# Patient Record
Sex: FEMALE | Race: WHITE | NOT HISPANIC OR LATINO | Employment: OTHER | URBAN - METROPOLITAN AREA
[De-identification: names, ages, dates, MRNs, and addresses within clinical notes are randomized per-mention and may not be internally consistent; named-entity substitution may affect disease eponyms.]

---

## 2020-09-21 ENCOUNTER — HOSPITAL ENCOUNTER (INPATIENT)
Facility: HOSPITAL | Age: 55
LOS: 1 days | Discharge: HOME | End: 2020-09-22
Attending: EMERGENCY MEDICINE | Admitting: INTERNAL MEDICINE

## 2020-09-21 ENCOUNTER — APPOINTMENT (OUTPATIENT)
Dept: CT IMAGING | Facility: HOSPITAL | Age: 55
End: 2020-09-21

## 2020-09-21 DIAGNOSIS — R41.0 CONFUSION: Primary | ICD-10-CM

## 2020-09-21 DIAGNOSIS — R47.1 DYSARTHRIA: ICD-10-CM

## 2020-09-21 LAB
ANION GAP SERPL CALCULATED.3IONS-SCNC: 9 MMOL/L (ref 5–15)
BASOPHILS # BLD AUTO: 0 10*3/MM3 (ref 0–0.2)
BASOPHILS NFR BLD AUTO: 0.6 % (ref 0–1.5)
BUN SERPL-MCNC: 13 MG/DL (ref 6–20)
BUN SERPL-MCNC: ABNORMAL MG/DL
BUN/CREAT SERPL: ABNORMAL
CALCIUM SPEC-SCNC: 9 MG/DL (ref 8.6–10.5)
CHLORIDE SERPL-SCNC: 108 MMOL/L (ref 98–107)
CO2 SERPL-SCNC: 27 MMOL/L (ref 22–29)
CREAT SERPL-MCNC: 1.24 MG/DL (ref 0.57–1)
DEPRECATED RDW RBC AUTO: 40.7 FL (ref 37–54)
EOSINOPHIL # BLD AUTO: 0.3 10*3/MM3 (ref 0–0.4)
EOSINOPHIL NFR BLD AUTO: 3.9 % (ref 0.3–6.2)
ERYTHROCYTE [DISTWIDTH] IN BLOOD BY AUTOMATED COUNT: 13.6 % (ref 12.3–15.4)
GFR SERPL CREATININE-BSD FRML MDRD: 45 ML/MIN/1.73
GLUCOSE BLDC GLUCOMTR-MCNC: 89 MG/DL (ref 70–105)
GLUCOSE SERPL-MCNC: 82 MG/DL (ref 65–99)
HCT VFR BLD AUTO: 39.5 % (ref 34–46.6)
HGB BLD-MCNC: 13.1 G/DL (ref 12–15.9)
LYMPHOCYTES # BLD AUTO: 2.8 10*3/MM3 (ref 0.7–3.1)
LYMPHOCYTES NFR BLD AUTO: 33.4 % (ref 19.6–45.3)
MCH RBC QN AUTO: 28.8 PG (ref 26.6–33)
MCHC RBC AUTO-ENTMCNC: 33.3 G/DL (ref 31.5–35.7)
MCV RBC AUTO: 86.5 FL (ref 79–97)
MONOCYTES # BLD AUTO: 0.9 10*3/MM3 (ref 0.1–0.9)
MONOCYTES NFR BLD AUTO: 10.4 % (ref 5–12)
NEUTROPHILS NFR BLD AUTO: 4.4 10*3/MM3 (ref 1.7–7)
NEUTROPHILS NFR BLD AUTO: 51.7 % (ref 42.7–76)
NRBC BLD AUTO-RTO: 0.1 /100 WBC (ref 0–0.2)
PLATELET # BLD AUTO: 341 10*3/MM3 (ref 140–450)
PMV BLD AUTO: 7.6 FL (ref 6–12)
POTASSIUM SERPL-SCNC: 4 MMOL/L (ref 3.5–5.2)
RBC # BLD AUTO: 4.57 10*6/MM3 (ref 3.77–5.28)
SODIUM SERPL-SCNC: 144 MMOL/L (ref 136–145)
WBC # BLD AUTO: 8.5 10*3/MM3 (ref 3.4–10.8)

## 2020-09-21 PROCEDURE — 82962 GLUCOSE BLOOD TEST: CPT

## 2020-09-21 PROCEDURE — 80048 BASIC METABOLIC PNL TOTAL CA: CPT | Performed by: EMERGENCY MEDICINE

## 2020-09-21 PROCEDURE — 93005 ELECTROCARDIOGRAM TRACING: CPT | Performed by: EMERGENCY MEDICINE

## 2020-09-21 PROCEDURE — 99284 EMERGENCY DEPT VISIT MOD MDM: CPT

## 2020-09-21 PROCEDURE — 70450 CT HEAD/BRAIN W/O DYE: CPT

## 2020-09-21 PROCEDURE — 85025 COMPLETE CBC W/AUTO DIFF WBC: CPT | Performed by: EMERGENCY MEDICINE

## 2020-09-21 RX ORDER — SODIUM CHLORIDE 0.9 % (FLUSH) 0.9 %
10 SYRINGE (ML) INJECTION AS NEEDED
Status: DISCONTINUED | OUTPATIENT
Start: 2020-09-21 | End: 2020-09-22 | Stop reason: HOSPADM

## 2020-09-22 ENCOUNTER — APPOINTMENT (OUTPATIENT)
Dept: MRI IMAGING | Facility: HOSPITAL | Age: 55
End: 2020-09-22

## 2020-09-22 ENCOUNTER — APPOINTMENT (OUTPATIENT)
Dept: CARDIOLOGY | Facility: HOSPITAL | Age: 55
End: 2020-09-22

## 2020-09-22 ENCOUNTER — APPOINTMENT (OUTPATIENT)
Dept: CT IMAGING | Facility: HOSPITAL | Age: 55
End: 2020-09-22

## 2020-09-22 VITALS
DIASTOLIC BLOOD PRESSURE: 81 MMHG | BODY MASS INDEX: 42.65 KG/M2 | HEART RATE: 72 BPM | SYSTOLIC BLOOD PRESSURE: 178 MMHG | OXYGEN SATURATION: 95 % | TEMPERATURE: 98.1 F | HEIGHT: 65 IN | WEIGHT: 256 LBS | RESPIRATION RATE: 22 BRPM

## 2020-09-22 PROBLEM — E66.01 MORBID OBESITY (HCC): Chronic | Status: ACTIVE | Noted: 2020-09-22

## 2020-09-22 PROBLEM — G43.909 MIGRAINE: Chronic | Status: ACTIVE | Noted: 2019-10-09

## 2020-09-22 PROBLEM — F32.A DEPRESSIVE DISORDER: Status: ACTIVE | Noted: 2019-10-09

## 2020-09-22 PROBLEM — R41.0 CONFUSION: Status: ACTIVE | Noted: 2020-09-22

## 2020-09-22 PROBLEM — K21.9 GERD WITHOUT ESOPHAGITIS: Chronic | Status: ACTIVE | Noted: 2020-09-22

## 2020-09-22 PROBLEM — G43.909 MIGRAINE: Status: ACTIVE | Noted: 2019-10-09

## 2020-09-22 PROBLEM — J30.2 SEASONAL ALLERGIES: Chronic | Status: ACTIVE | Noted: 2020-09-22

## 2020-09-22 PROBLEM — R47.9 DIFFICULTY WITH SPEECH: Status: ACTIVE | Noted: 2020-09-22

## 2020-09-22 PROBLEM — N17.9 AKI (ACUTE KIDNEY INJURY) (HCC): Status: ACTIVE | Noted: 2020-09-22

## 2020-09-22 PROBLEM — Z78.0 MENOPAUSE: Chronic | Status: ACTIVE | Noted: 2020-09-22

## 2020-09-22 LAB
CHOLEST SERPL-MCNC: 175 MG/DL (ref 0–200)
GLUCOSE BLDC GLUCOMTR-MCNC: 105 MG/DL (ref 70–105)
HBA1C MFR BLD: 5.8 % (ref 3.5–5.6)
HDLC SERPL-MCNC: 45 MG/DL (ref 40–60)
HOLD SPECIMEN: NORMAL
LDLC SERPL CALC-MCNC: 95 MG/DL (ref 0–100)
LDLC/HDLC SERPL: 2.12 {RATIO}
TRIGL SERPL-MCNC: 173 MG/DL (ref 0–150)
TSH SERPL DL<=0.05 MIU/L-ACNC: 1.84 UIU/ML (ref 0.27–4.2)
VIT B12 BLD-MCNC: 890 PG/ML (ref 211–946)
VLDLC SERPL-MCNC: 34.6 MG/DL

## 2020-09-22 PROCEDURE — 97165 OT EVAL LOW COMPLEX 30 MIN: CPT

## 2020-09-22 PROCEDURE — 70551 MRI BRAIN STEM W/O DYE: CPT

## 2020-09-22 PROCEDURE — 80061 LIPID PANEL: CPT | Performed by: NURSE PRACTITIONER

## 2020-09-22 PROCEDURE — 83036 HEMOGLOBIN GLYCOSYLATED A1C: CPT | Performed by: NURSE PRACTITIONER

## 2020-09-22 PROCEDURE — 70496 CT ANGIOGRAPHY HEAD: CPT

## 2020-09-22 PROCEDURE — 84443 ASSAY THYROID STIM HORMONE: CPT | Performed by: NURSE PRACTITIONER

## 2020-09-22 PROCEDURE — 97161 PT EVAL LOW COMPLEX 20 MIN: CPT

## 2020-09-22 PROCEDURE — 25010000002 ENOXAPARIN PER 10 MG: Performed by: NURSE PRACTITIONER

## 2020-09-22 PROCEDURE — 82962 GLUCOSE BLOOD TEST: CPT

## 2020-09-22 PROCEDURE — 93306 TTE W/DOPPLER COMPLETE: CPT | Performed by: INTERNAL MEDICINE

## 2020-09-22 PROCEDURE — 99236 HOSP IP/OBS SAME DATE HI 85: CPT | Performed by: INTERNAL MEDICINE

## 2020-09-22 PROCEDURE — 0 IOPAMIDOL PER 1 ML: Performed by: INTERNAL MEDICINE

## 2020-09-22 PROCEDURE — 99253 IP/OBS CNSLTJ NEW/EST LOW 45: CPT | Performed by: NURSE PRACTITIONER

## 2020-09-22 PROCEDURE — 82607 VITAMIN B-12: CPT | Performed by: NURSE PRACTITIONER

## 2020-09-22 PROCEDURE — 70498 CT ANGIOGRAPHY NECK: CPT

## 2020-09-22 PROCEDURE — 93306 TTE W/DOPPLER COMPLETE: CPT

## 2020-09-22 RX ORDER — ASPIRIN 325 MG
325 TABLET ORAL DAILY
Status: DISCONTINUED | OUTPATIENT
Start: 2020-09-22 | End: 2020-09-22

## 2020-09-22 RX ORDER — ASPIRIN 81 MG/1
81 TABLET ORAL DAILY
COMMUNITY
End: 2020-09-22 | Stop reason: HOSPADM

## 2020-09-22 RX ORDER — ONDANSETRON 2 MG/ML
4 INJECTION INTRAMUSCULAR; INTRAVENOUS EVERY 6 HOURS PRN
Status: DISCONTINUED | OUTPATIENT
Start: 2020-09-22 | End: 2020-09-22 | Stop reason: HOSPADM

## 2020-09-22 RX ORDER — ACETAMINOPHEN 325 MG/1
650 TABLET ORAL EVERY 4 HOURS PRN
Status: DISCONTINUED | OUTPATIENT
Start: 2020-09-22 | End: 2020-09-22 | Stop reason: HOSPADM

## 2020-09-22 RX ORDER — SODIUM CHLORIDE 0.9 % (FLUSH) 0.9 %
10 SYRINGE (ML) INJECTION EVERY 12 HOURS SCHEDULED
Status: DISCONTINUED | OUTPATIENT
Start: 2020-09-22 | End: 2020-09-22 | Stop reason: HOSPADM

## 2020-09-22 RX ORDER — ACETAMINOPHEN 650 MG/1
650 SUPPOSITORY RECTAL EVERY 4 HOURS PRN
Status: DISCONTINUED | OUTPATIENT
Start: 2020-09-22 | End: 2020-09-22 | Stop reason: HOSPADM

## 2020-09-22 RX ORDER — ASPIRIN 300 MG/1
300 SUPPOSITORY RECTAL DAILY
Status: DISCONTINUED | OUTPATIENT
Start: 2020-09-22 | End: 2020-09-22

## 2020-09-22 RX ORDER — LEVOTHYROXINE SODIUM 0.07 MG/1
75 TABLET ORAL DAILY
Start: 2020-09-22

## 2020-09-22 RX ORDER — SODIUM CHLORIDE 0.9 % (FLUSH) 0.9 %
10 SYRINGE (ML) INJECTION AS NEEDED
Status: DISCONTINUED | OUTPATIENT
Start: 2020-09-22 | End: 2020-09-22 | Stop reason: HOSPADM

## 2020-09-22 RX ORDER — VILAZODONE HYDROCHLORIDE 40 MG/1
40 TABLET ORAL DAILY
Status: DISCONTINUED | OUTPATIENT
Start: 2020-09-22 | End: 2020-09-22 | Stop reason: HOSPADM

## 2020-09-22 RX ORDER — LABETALOL HYDROCHLORIDE 5 MG/ML
10 INJECTION, SOLUTION INTRAVENOUS EVERY 4 HOURS PRN
Status: DISCONTINUED | OUTPATIENT
Start: 2020-09-22 | End: 2020-09-22 | Stop reason: HOSPADM

## 2020-09-22 RX ORDER — VILAZODONE HYDROCHLORIDE 40 MG/1
40 TABLET ORAL DAILY
COMMUNITY

## 2020-09-22 RX ORDER — MONTELUKAST SODIUM 10 MG/1
10 TABLET ORAL NIGHTLY
COMMUNITY

## 2020-09-22 RX ORDER — LISINOPRIL 20 MG/1
40 TABLET ORAL
Status: DISCONTINUED | OUTPATIENT
Start: 2020-09-22 | End: 2020-09-22 | Stop reason: HOSPADM

## 2020-09-22 RX ORDER — CETIRIZINE HYDROCHLORIDE 10 MG/1
10 TABLET ORAL DAILY
Status: DISCONTINUED | OUTPATIENT
Start: 2020-09-22 | End: 2020-09-22 | Stop reason: HOSPADM

## 2020-09-22 RX ORDER — SUMATRIPTAN 50 MG/1
50 TABLET, FILM COATED ORAL
Status: DISCONTINUED | OUTPATIENT
Start: 2020-09-22 | End: 2020-09-22 | Stop reason: HOSPADM

## 2020-09-22 RX ORDER — LISINOPRIL 40 MG/1
40 TABLET ORAL
Start: 2020-09-22

## 2020-09-22 RX ORDER — ATORVASTATIN CALCIUM 40 MG/1
40 TABLET, FILM COATED ORAL NIGHTLY
COMMUNITY
End: 2020-09-22 | Stop reason: HOSPADM

## 2020-09-22 RX ORDER — MONTELUKAST SODIUM 10 MG/1
10 TABLET ORAL NIGHTLY
Status: DISCONTINUED | OUTPATIENT
Start: 2020-09-22 | End: 2020-09-22 | Stop reason: HOSPADM

## 2020-09-22 RX ORDER — CETIRIZINE HYDROCHLORIDE 10 MG/1
10 TABLET ORAL DAILY
COMMUNITY

## 2020-09-22 RX ORDER — ATORVASTATIN CALCIUM 80 MG/1
80 TABLET, FILM COATED ORAL NIGHTLY
Qty: 30 TABLET | Refills: 0 | Status: SHIPPED | OUTPATIENT
Start: 2020-09-22 | End: 2020-10-22

## 2020-09-22 RX ORDER — CLOPIDOGREL BISULFATE 75 MG/1
75 TABLET ORAL DAILY
Qty: 30 TABLET | Refills: 3 | Status: SHIPPED | OUTPATIENT
Start: 2020-09-23 | End: 2020-10-23

## 2020-09-22 RX ORDER — CLOPIDOGREL BISULFATE 75 MG/1
75 TABLET ORAL DAILY
Status: DISCONTINUED | OUTPATIENT
Start: 2020-09-22 | End: 2020-09-22 | Stop reason: HOSPADM

## 2020-09-22 RX ORDER — SUMATRIPTAN 50 MG/1
TABLET, FILM COATED ORAL
Qty: 20 TABLET | Refills: 0 | Status: SHIPPED | OUTPATIENT
Start: 2020-09-22

## 2020-09-22 RX ORDER — AMLODIPINE BESYLATE 5 MG/1
10 TABLET ORAL
Status: DISCONTINUED | OUTPATIENT
Start: 2020-09-22 | End: 2020-09-22 | Stop reason: HOSPADM

## 2020-09-22 RX ORDER — LEVOTHYROXINE SODIUM 0.07 MG/1
75 TABLET ORAL
Status: DISCONTINUED | OUTPATIENT
Start: 2020-09-22 | End: 2020-09-22 | Stop reason: HOSPADM

## 2020-09-22 RX ORDER — ATORVASTATIN CALCIUM 40 MG/1
80 TABLET, FILM COATED ORAL NIGHTLY
Status: DISCONTINUED | OUTPATIENT
Start: 2020-09-22 | End: 2020-09-22 | Stop reason: HOSPADM

## 2020-09-22 RX ORDER — ONDANSETRON 4 MG/1
4 TABLET, FILM COATED ORAL EVERY 6 HOURS PRN
Status: DISCONTINUED | OUTPATIENT
Start: 2020-09-22 | End: 2020-09-22 | Stop reason: HOSPADM

## 2020-09-22 RX ADMIN — CETIRIZINE HYDROCHLORIDE 10 MG: 10 TABLET, FILM COATED ORAL at 08:13

## 2020-09-22 RX ADMIN — LABETALOL 20 MG/4 ML (5 MG/ML) INTRAVENOUS SYRINGE: at 14:16

## 2020-09-22 RX ADMIN — SUMATRIPTAN SUCCINATE 50 MG: 50 TABLET ORAL at 10:42

## 2020-09-22 RX ADMIN — SODIUM CHLORIDE 500 ML: 900 INJECTION, SOLUTION INTRAVENOUS at 08:32

## 2020-09-22 RX ADMIN — ASPIRIN 325 MG ORAL TABLET 325 MG: 325 PILL ORAL at 08:14

## 2020-09-22 RX ADMIN — ENOXAPARIN SODIUM 40 MG: 40 INJECTION SUBCUTANEOUS at 17:13

## 2020-09-22 RX ADMIN — AMLODIPINE BESYLATE 10 MG: 5 TABLET ORAL at 08:14

## 2020-09-22 RX ADMIN — IOPAMIDOL 100 ML: 755 INJECTION, SOLUTION INTRAVENOUS at 10:00

## 2020-09-22 RX ADMIN — LEVOTHYROXINE SODIUM 75 MCG: 75 TABLET ORAL at 08:13

## 2020-09-22 RX ADMIN — Medication 10 ML: at 08:14

## 2020-09-22 RX ADMIN — LISINOPRIL 40 MG: 20 TABLET ORAL at 17:13

## 2020-09-22 RX ADMIN — LABETALOL 20 MG/4 ML (5 MG/ML) INTRAVENOUS SYRINGE 10 MG: at 03:54

## 2020-09-22 RX ADMIN — CLOPIDOGREL BISULFATE 75 MG: 75 TABLET ORAL at 10:42

## 2020-09-22 RX ADMIN — VILAZODONE HYDROCHLORIDE 40 MG: 40 TABLET ORAL at 08:14

## 2020-09-22 NOTE — ED NOTES
Patient reports blurred vision and expressive aphasia that started approx. 2030 tonight. Denies any weakness at the time. On ED arrival, All of her symptoms are resolved.       Helen Zabala LPN  09/22/20 0036

## 2020-09-22 NOTE — DISCHARGE SUMMARY
[facility] Medicine Services  DISCHARGE SUMMARY        Prepared For PCP:  Rey Portillo PA-C    Patient Name: Judy Cooper  : 1965  MRN: [id number]      Date of Admission:   2020    Date of Discharge:  2020    Length of stay:  LOS: 0 days     Hospital Course     Presenting Problem:   Confusion [R41.0]  Dysarthria [R47.1]      Active Hospital Problems    Diagnosis  POA   • **Difficulty with speech [R47.9]  Yes   • Morbid obesity (CMS/HCC) [E66.01]  Yes   • GERD without esophagitis [K21.9]  Yes   • DEVON (acute kidney injury) (CMS/[facility]) [N17.9]  Yes   • Seasonal allergies [J30.2]  Yes   • Menopause [Z78.0]  Yes   • Migraine [G43.909]  Yes   • Hypothyroidism [E03.9]  Yes   • Hyperlipidemia [E78.5]  Yes   • HTN (hypertension) [I10]  Yes      Resolved Hospital Problems   No resolved problems to display.           Hospital Course:  Judy Cooper is a 55 y.o. female admitted and discharged on the same day.  She had been evaluated by neurologist and her medications adjusted as.  H&P note.    Patient is aware of the results of her labs and imaging and need for follow-up with neurology    Refer to H&P note for details  No driving  Depression score was slightly elevated.  Patient given option to see psychiatry in the hospital.  She is not suicidal per nursing staff.        Recommendation for Outpatient Providers:             Reasons For Change In Medications and Indications for New Medications:        Day of Discharge     HPI:       Vital Signs:   Temp:  [97.8 °F (36.6 °C)-98.1 °F (36.7 °C)] 98.1 °F (36.7 °C)  Heart Rate:  [60-79] 72  Resp:  [16-22] 22  BP: (147-178)/(60-87) 178/81     Physical Exam:  Physical Exam    Pertinent  and/or Most Recent Results     Results from last 7 days   Lab Units 20  2311 20  2310   WBC 10*3/mm3 8.50  --    HEMOGLOBIN g/dL 13.1  --    HEMATOCRIT % 39.5  --    PLATELETS 10*3/mm3 341  --    SODIUM mmol/L  --  144   POTASSIUM mmol/L  --  4.0      CHLORIDE mmol/L  --  108*   CO2 mmol/L  --  27.0   BUN   --  13   CREATININE mg/dL  --  1.24*   GLUCOSE mg/dL  --  82   CALCIUM mg/dL  --  9.0           Invalid input(s): PROT, LABALBU  Results from last 7 days   Lab Units 09/22/20  0415   CHOLESTEROL mg/dL 175   TRIGLYCERIDES mg/dL 173*   HDL CHOL mg/dL 45     Results from last 7 days   Lab Units 09/22/20  0415   TSH uIU/mL 1.840   HEMOGLOBIN A1C % 5.8*       Brief Urine Lab Results     None          Microbiology Results Abnormal     None          Ct Head Without Contrast    Result Date: 9/21/2020  Impression: No acute intracranial abnormality. Electronically signed by:  Chente Curry M.D.  9/21/2020 10:07 PM    Ct Angiogram Neck    Result Date: 9/22/2020  Impression:  1. Normal intracranial and extracranial CTA.  Electronically Signed By-Luis Humphrey On:9/22/2020 2:54 PM This report was finalized on [id number] by  Luis Humphrey, .    Mri Brain Without Contrast    Result Date: 9/22/2020  Impression: 1.No evidence of acute ischemia. 2.Mild subcortical T2/FLAIR white matter hyperintensities are nonspecific, but are most commonly seen in the setting of chronic small vessel ischemic change.  Electronically Signed ByKen Ortiz On:9/22/2020 11:31 AM This report was finalized on [id number] by  Alesha Ortiz, .    Ct Angiogram Head    Result Date: 9/22/2020  Impression:  1. Normal intracranial and extracranial CTA.  Electronically Signed ByFelton Humphrey On:9/22/2020 2:54 PM This report was finalized on [id number] by  Luis Humphrey, .                             Test Results Pending at Discharge  Pending Labs     Order Current Status    Extra Tubes In process            Procedures Performed           Consults:   Consults     Date and Time Order Name Status Description    9/22/2020 0223 Inpatient Neurology Consult Stroke Completed     9/22/2020 0012 Hospitalist (on-call MD unless specified) Completed             Discharge Details        Discharge Medications       New Medications      Instructions Start Date   clopidogrel 75 MG tablet  Commonly known as: PLAVIX   75 mg, Oral, Daily   Start Date: September 23, 2020     levothyroxine 75 MCG tablet  Commonly known as: SYNTHROID, LEVOTHROID   75 mcg, Oral, Daily      lisinopril 40 MG tablet  Commonly known as: PRINIVIL,ZESTRIL   40 mg, Oral, Every 24 Hours Scheduled      SUMAtriptan 50 MG tablet  Commonly known as: IMITREX   Take one tablet at onset of headache. May repeat dose one time in 2 hours if headache not relieved.         Changes to Medications      Instructions Start Date   atorvastatin 80 MG tablet  Commonly known as: LIPITOR  What changed:   · medication strength  · how much to take   80 mg, Oral, Nightly         Continue These Medications      Instructions Start Date   cetirizine 10 MG tablet  Commonly known as: zyrTEC   10 mg, Oral, Daily      montelukast 10 MG tablet  Commonly known as: SINGULAIR   10 mg, Oral, Nightly      NON FORMULARY   1 each, Oral, Daily, DHEA-5       PROGESTERONE PO   300 mg, Oral, Daily      SUPER B COMPLEX PO   1 tablet, Oral, Daily      vilazodone 40 MG tablet tablet  Commonly known as: VIIBRYD   40 mg, Oral, Daily         Stop These Medications    aspirin 81 MG EC tablet            Allergies   Allergen Reactions   • Tetanus Toxoids Anaphylaxis         Discharge Disposition:  Home or Self Care    Diet:  Hospital:  Diet Order   Procedures   • Diet Cardiac; Healthy Heart         Discharge Activity:         CODE STATUS:    Code Status and Medical Interventions:   Ordered at: 09/22/20 0223     Code Status:    CPR     Medical Interventions (Level of Support Prior to Arrest):    Full         Follow-up Appointments  No future appointments.          Condition on Discharge:      Stable      This patient has been examined wearing appropriate Personal Protective Equipment and discussed with hospital infection control department. 09/22/20      Electronically signed by Dread Thompson MD, 09/22/20, 3:58  PM EDT.      Time: I spent  55  minutes on this discharge activity which included face-to-face encounter with the patient/reviewing the data in the system/coordination of the care with the nursing staff as well as consultants/documentation/entering orders.

## 2020-09-22 NOTE — PLAN OF CARE
Problem: Adult Inpatient Plan of Care  Goal: Plan of Care Review  Outcome: Ongoing, Progressing  Flowsheets  Taken 9/22/2020 1337  Plan of Care Reviewed With: patient  Taken 9/22/2020 1333  Plan of Care Reviewed With: patient  Outcome Summary: Pt is a 54 y/o F presenting with speech difficulties and found to have TIA. MRI was negative for acute ischemia. PMH includes hypothyroidism, hyperlipidemia, GERD, HTN and migraines. Pt indep with ADLs and has returned to baseline level of indep with ADLs. No UE ROM/strength or sensation deficits noted. OT will sign off at this time. Pt safe to discharge home with family. PPE worn: gloves, mask and goggles.

## 2020-09-22 NOTE — SIGNIFICANT NOTE
Orders received and appreciated for speech-language evaluation. Pt's imaging is negative for new CVA. Speech is at baseline. Speech eval not indicated at this time. Please contact this dept if any further needs arise.

## 2020-09-22 NOTE — PLAN OF CARE
NIH = 0  Plan for today MRI of Brain and CTA head and neck, neurology consult    Patient symptoms that have resolved: vision change, expressive aphasia, dizziness.       Problem: Adult Inpatient Plan of Care  Goal: Plan of Care Review  Outcome: Ongoing, Progressing  Flowsheets (Taken 9/22/2020 0901)  Plan of Care Reviewed With: patient  Goal: Patient-Specific Goal (Individualized)  Outcome: Ongoing, Progressing  Goal: Absence of Hospital-Acquired Illness or Injury  Outcome: Ongoing, Progressing  Intervention: Identify and Manage Fall Risk  Recent Flowsheet Documentation  Taken 9/22/2020 0844 by April Vance RN  Safety Promotion/Fall Prevention:  • assistive device/personal items within reach  • clutter free environment maintained  • safety round/check completed  • nonskid shoes/slippers when out of bed  Taken 9/22/2020 0800 by April Vance RN  Safety Promotion/Fall Prevention:  • assistive device/personal items within reach  • clutter free environment maintained  • nonskid shoes/slippers when out of bed  • safety round/check completed  Intervention: Prevent Skin Injury  Recent Flowsheet Documentation  Taken 9/22/2020 0844 by April Vance RN  Body Position: position changed independently  Intervention: Prevent and Manage VTE (venous thromboembolism) Risk  Recent Flowsheet Documentation  Taken 9/22/2020 0844 by April Vance RN  VTE Prevention/Management: (SCDs ordered, non on unit, wll work to obtain)  • bilateral  • other (see comments)  Goal: Optimal Comfort and Wellbeing  Outcome: Ongoing, Progressing  Intervention: Provide Person-Centered Care  Recent Flowsheet Documentation  Taken 9/22/2020 0844 by April Vance RN  Trust Relationship/Rapport: care explained  Goal: Readiness for Transition of Care  Outcome: Ongoing, Progressing     Problem: Cerebral Tissue Perfusion Risk (Stroke, Ischemic/Transient Ischemic Attack)  Goal: Optimal Cerebral Tissue Perfusion  Outcome: Ongoing, Progressing     Problem: Pain  (Stroke, Ischemic/Transient Ischemic Attack)  Goal: Acceptable Pain Control  Outcome: Ongoing, Progressing  Intervention: Monitor and Manage Pain  Recent Flowsheet Documentation  Taken 9/22/2020 0844 by April Vance, RN  Pain Management Interventions: (wating on imitrex from pharmacy) other (see comments)  Taken 9/22/2020 0714 by April Vance, RN  Pain Management Interventions: (declines tylenol, secure chatted dr to request imitrex) other (see comments)

## 2020-09-22 NOTE — PROGRESS NOTES
Discharge Planning Assessment  [facility]     Patient Name: Judy Cooper  MRN: [id number]  Today's Date: 9/22/2020    Admit Date: 9/21/2020    Discharge Needs Assessment     Row Name 09/22/20 1413       Living Environment    Lives With  spouse    Current Living Arrangements  home/apartment/condo    Primary Care Provided by  self    Provides Primary Care For  no one    Family Caregiver if Needed  spouse    Quality of Family Relationships  helpful    Able to Return to Prior Arrangements  yes       Resource/Environmental Concerns    Resource/Environmental Concerns  none    Transportation Concerns  car, none       Transition Planning    Patient/Family Anticipates Transition to  home with family    Patient/Family Anticipated Services at Transition  none    Transportation Anticipated  family or friend will provide       Discharge Needs Assessment    Readmission Within the Last 30 Days  no previous admission in last 30 days    Equipment Currently Used at Home  none    Concerns to be Addressed  denies needs/concerns at this time    Anticipated Changes Related to Illness  none    Equipment Needed After Discharge  none        Discharge Plan     Row Name 09/22/20 1414       Plan    Plan  Anticipate routine home    Patient/Family in Agreement with Plan  yes    Plan Comments  Met with patient at bedside, reports lives at home with spouse, I with ADLs, still drives, no DME. PCP confirmed and updated care teams. No issues affording food or medications. Currently denies any d/c needs or concerns.          Expected Discharge Date and Time     Expected Discharge Date Expected Discharge Time    Sep 23, 2020         Demographic Summary     Row Name 09/22/20 1413       General Information    Admission Type  inpatient    Arrived From  emergency department    Referral Source  admission list    Reason for Consult  discharge planning    Preferred Language  English     Used During This Interaction  no        Functional Status     Row  Name 09/22/20 1413       Functional Status    Usual Activity Tolerance  good    Current Activity Tolerance  good       Functional Status, IADL    Medications  independent    Meal Preparation  independent    Housekeeping  independent    Laundry  independent    Shopping  independent              Karen Calles RN

## 2020-09-22 NOTE — NURSING NOTE
"Patient scored a \"6\" on the discharge depression screening tool, making her life,  \"somewhat difficult\", Dr. Thompson notified.     offered to have the patient seen by psychiatry if she wanted, and the patient declined to be seen by the psychiatrist.  "

## 2020-09-22 NOTE — CONSULTS
"    Primary Care Provider: Provider, No Known     Consult requested by: FRANCES Flores    Reason for Consultation: Neurological evaluation, stroke     History taken from: patient chart RN    Chief complaint: Speech difficulty        SUBJECTIVE:    History of present illness: Per H&P: Ms. Cooper is a 55 y.o. female with a past medical history of hypothyroidism, hyperlipidemia, GERD, hypertension, and migraines who presented [facility] on 9/21/2020 with complaints of speech difficulty.  Patient states last night she was sitting on her couch watching TV and when she tried to talk she could not get out what she wanted to say.  She explained she kept repeating words.  This was accompanied by dizziness and she explained that she felt \"drunk.\"  She has not drank alcohol in 10+ years.  She explains before that she was reading something and it was very blurry.  This episode of the symptoms lasted 20 to 25 minutes and has since subsided.  He denies any aggravating or alleviating factors.  She explains right now she has a migraine, in which she gets about 3 times a month.  She explained that she has not slept and she believes this triggered a migraine.  She denies a family history of CVA or that this is never happened to her before.  She denies any recent nausea, vomiting, diarrhea, fever, chills, cough, shortness of breath, or chest pain.     Portions of the above HPI have been copied from previous encounters and edited as appropriate.    Patient states she had some blurred vision followed by word finding difficulty and dizziness.  This lasted altogether around 20-25 minutes before completely resolving.  She did have a headache but not until the next morning and she  does have history of migraines.  Denies focal deficit, visual deficit, ataxia.     Review of Systems   Constitutional: Negative for fatigue.   Eyes: Positive for visual disturbance (Blurry vision).   Cardiovascular: Negative.    Neurological: Positive " for speech difficulty and headaches. Negative for dizziness, tremors, seizures, syncope, facial asymmetry, weakness, light-headedness and numbness.          PATIENT HISTORY:  Past Medical History:   Diagnosis Date   • Disease of thyroid gland    • Elevated cholesterol    • GERD (gastroesophageal reflux disease)    • Hypertension    • Migraine 10/9/2019   • Morbid obesity (CMS/HCC) 9/22/2020   • Seasonal allergies 9/22/2020   ,   Past Surgical History:   Procedure Laterality Date   • COLONOSCOPY     • HYSTERECTOMY     • TONSILLECTOMY     ,   Family History   Problem Relation Age of Onset   • Cancer Mother    • Cancer Father    ,   Social History     Tobacco Use   • Smoking status: Never Smoker   Substance Use Topics   • Alcohol use: Not Currently     Comment: no alcohol for 10 + years   • Drug use: Never   ,   Medications Prior to Admission   Medication Sig Dispense Refill Last Dose   • aspirin 81 MG EC tablet Take 81 mg by mouth Daily.      • atorvastatin (LIPITOR) 40 MG tablet Take 40 mg by mouth Every Night.      • B Complex-C (SUPER B COMPLEX PO) Take 1 tablet by mouth Daily.      • cetirizine (zyrTEC) 10 MG tablet Take 10 mg by mouth Daily.      • montelukast (SINGULAIR) 10 MG tablet Take 10 mg by mouth Every Night.      • NON FORMULARY Take 1 each by mouth Daily. DHEA-5      • Progesterone Micronized (PROGESTERONE PO) Take 300 mg by mouth Daily.      • vilazodone (VIIBRYD) 40 MG tablet tablet Take 40 mg by mouth Daily.      , Scheduled Meds:  amLODIPine, 10 mg, Oral, Q24H  aspirin, 325 mg, Oral, Daily    Or  aspirin, 300 mg, Rectal, Daily  atorvastatin, 80 mg, Oral, Nightly  cetirizine, 10 mg, Oral, Daily  enoxaparin, 40 mg, Subcutaneous, Q24H  levothyroxine, 75 mcg, Oral, Q AM  montelukast, 10 mg, Oral, Nightly  sodium chloride, 500 mL, Intravenous, Once  sodium chloride, 10 mL, Intravenous, Q12H  vilazodone, 40 mg, Oral, Daily    , Continuous Infusions:   , PRN Meds:  •  acetaminophen **OR** acetaminophen  •   labetalol  •  ondansetron **OR** ondansetron  •  [COMPLETED] Insert peripheral IV **AND** sodium chloride  •  sodium chloride  •  SUMAtriptan, Allergies:  Tetanus toxoids    ________________________________________________________        OBJECTIVE:    PHYSICAL EXAM:    Constitutional: The patient is in no apparent distress, bright awake and alert. There is no shortness of breath.     PSYCHIATRIC: Mood/affect normal, judgement normal, appropriate    HEENT: There is no tenderness over the temporal arteries bilaterally. Normocephalic, atraumatic.     Chest: Breathing unlabored    Cardiac: Regular rate and rhythm.     Extremities:  No clubbing, cyanosis or edema.    NEUROLOGICAL:    Cognition:   Fully oriented.  Fund of knowledge excellent.  Concentration and attention normal.   Language normal with normal comprehension, fluent speech, intact repetition and naming.   Short and long term memory appears intact    Cranial nerves;    II - pupils bilaterally equal reacting to light,  No new Visual field deficits;  Fundoscopic exam- Not able to be done, non-dilated exam  III,IV,VI: EOMI with no diplopia  V: Normal facial sensations  VII: No facial asymmetry,  VIII: No New hearing abnormality  IX, X, XI: normal gag and shoulder shrug;  XII: tongue is in the midline.    Sensory:  Intact to light touch in all extremities.     Motor: Strength 5/5 bilaterally upper and lower extremities. No involuntary movements present. Normal tone and bulk.  Deep tendon reflexes: 2/4 and symmetrical in biceps, brachioradialis, triceps, bilateral 2/4 knees and ankles. Both plantars are flexor.    Cerebellar: Finger to nose and mirror movements normal bilaterally.    Gait and balance: Deferred.     Physical exam performed by FRANCES Parson.  ________________________________________________________   RESULTS REVIEW:    VITAL SIGNS:   Temp:  [97.9 °F (36.6 °C)-98.1 °F (36.7 °C)] 97.9 °F (36.6 °C)  Heart Rate:  [60-79] 62  Resp:  [16-21] 21  BP:  (147-160)/(61-87) 148/66     LABS:  WBC   Date Value Ref Range Status   09/21/2020 8.50 3.40 - 10.80 10*3/mm3 Final     RBC   Date Value Ref Range Status   09/21/2020 4.57 3.77 - 5.28 10*6/mm3 Final     Hemoglobin   Date Value Ref Range Status   09/21/2020 13.1 12.0 - 15.9 g/dL Final     Hematocrit   Date Value Ref Range Status   09/21/2020 39.5 34.0 - 46.6 % Final     MCV   Date Value Ref Range Status   09/21/2020 86.5 79.0 - 97.0 fL Final     MCH   Date Value Ref Range Status   09/21/2020 28.8 26.6 - 33.0 pg Final     MCHC   Date Value Ref Range Status   09/21/2020 33.3 31.5 - 35.7 g/dL Final     RDW   Date Value Ref Range Status   09/21/2020 13.6 12.3 - 15.4 % Final     RDW-SD   Date Value Ref Range Status   09/21/2020 40.7 37.0 - 54.0 fl Final     MPV   Date Value Ref Range Status   09/21/2020 7.6 6.0 - 12.0 fL Final     Platelets   Date Value Ref Range Status   09/21/2020 341 140 - 450 10*3/mm3 Final     Neutrophil %   Date Value Ref Range Status   09/21/2020 51.7 42.7 - 76.0 % Final     Lymphocyte %   Date Value Ref Range Status   09/21/2020 33.4 19.6 - 45.3 % Final     Monocyte %   Date Value Ref Range Status   09/21/2020 10.4 5.0 - 12.0 % Final     Eosinophil %   Date Value Ref Range Status   09/21/2020 3.9 0.3 - 6.2 % Final     Basophil %   Date Value Ref Range Status   09/21/2020 0.6 0.0 - 1.5 % Final     Neutrophils, Absolute   Date Value Ref Range Status   09/21/2020 4.40 1.70 - 7.00 10*3/mm3 Final     Lymphocytes, Absolute   Date Value Ref Range Status   09/21/2020 2.80 0.70 - 3.10 10*3/mm3 Final     Monocytes, Absolute   Date Value Ref Range Status   09/21/2020 0.90 0.10 - 0.90 10*3/mm3 Final     Eosinophils, Absolute   Date Value Ref Range Status   09/21/2020 0.30 0.00 - 0.40 10*3/mm3 Final     Basophils, Absolute   Date Value Ref Range Status   09/21/2020 0.00 0.00 - 0.20 10*3/mm3 Final     nRBC   Date Value Ref Range Status   09/21/2020 0.1 0.0 - 0.2 /100 WBC Final     Glucose   Date Value Ref Range  Status   09/21/2020 82 65 - 99 mg/dL Final     BUN   Date Value Ref Range Status   09/21/2020   Final     Comment:     Testing performed by alternate method   09/21/2020 13 6 - 20 mg/dL Final     Creatinine   Date Value Ref Range Status   09/21/2020 1.24 (H) 0.57 - 1.00 mg/dL Final     Sodium   Date Value Ref Range Status   09/21/2020 144 136 - 145 mmol/L Final     Potassium   Date Value Ref Range Status   09/21/2020 4.0 3.5 - 5.2 mmol/L Final     Chloride   Date Value Ref Range Status   09/21/2020 108 (H) 98 - 107 mmol/L Final     CO2   Date Value Ref Range Status   09/21/2020 27.0 22.0 - 29.0 mmol/L Final     Calcium   Date Value Ref Range Status   09/21/2020 9.0 8.6 - 10.5 mg/dL Final     eGFR Non  Amer   Date Value Ref Range Status   09/21/2020 45 (L) >60 mL/min/1.73 Final     BUN/Creatinine Ratio   Date Value Ref Range Status   09/21/2020   Final     Comment:     Testing not performed     Anion Gap   Date Value Ref Range Status   09/21/2020 9.0 5.0 - 15.0 mmol/L Final       Lab Results   Component Value Date    TSH 1.840 09/22/2020    LDL 95 09/22/2020         IMAGING STUDIES:  Ct Head Without Contrast    Result Date: 9/21/2020  No acute intracranial abnormality. Electronically signed by:  Chente Curry M.D.  9/21/2020 10:07 PM      I reviewed the patient's new clinical results.      ________________________________________________________     PROBLEM LIST:    Difficulty with speech    HTN (hypertension)    Migraine    Hyperlipidemia    Hypothyroidism    Morbid obesity (CMS/[facility])    GERD without esophagitis    DEVON (acute kidney injury) (CMS/[facility])    Seasonal allergies    Menopause          Assessment/Plan   ASSESSMENT/PLAN:  1.  Word finding difficulty, resolved.  No other focal deficit, visual changes or ataxia.  MRI brain negative for stroke, likely TIA but cannot rule out seizure.  Another differential is migraine, pt has significant history.   - CT head did not show acute findings   - MRI brain: No  evidence of acute ischemia, mild subcortical T2 flair white matter hyperintensities nonspecific.  - CTA head and neck:  Normal intracranial and extracranial CTA.  - Echo pending read- can have PCP follow up with outpatient.   - EKG: Sinus rhythm, rate 68, ND interval 155  - Labs: A1C: 5.8, B12: 890, LDL:  95, TSH: 1.840  - Antithrombotics: Switch to Plavix 75 mg daily  - Statin: Increase to Lipitor 80 mg  - PT/OT/ST as appropriate, Neuro checks per protocol, DVT prophylaxis, Stroke education  -Discussed if patient has further word finding difficulties in the future, patient will need to have an EEG outpatient to rule out seizures.     2. Hyperlipidemia  - Statin & dietary modifications    3. Hypertension  - Continue home medications  - BP goal 130/90, avoid hypotension    4. Modification of stroke risk factors:   - Blood pressure should be less than 130/80 outpatient, HbA1c less than 6.5, LDL less than 70; b12>500 and smoking cessation if applicable. We would be grateful if the primary team / primary care physician would keep a close watch on the above targets.  - Stroke education    We will sign off.  No further recommendation at this time.  Follow-up with primary care provider. I discussed the patient's findings and my recommendations with patient and nursing staff.     ZAHRA Campa  09/22/20  09:49 EDT

## 2020-09-22 NOTE — CONSULTS
Diabetes Education    Patient Name:  Judy Cooper  YOB: 1965  MRN: [id number]  Admit Date:  9/21/2020        Patient to be seen per stroke protocol. Patient's . A1C 5.8%.  Will not see patient for diabetes education at this time.    Did see patient do discuss prediabetes and how to try to prevent diabetes. Discussed the one plate method. Encouraged pt to increase physical activity. Patient states she has been working on losing weight and has done so recently.      Electronically signed by:  Sharyn Jackson RN  09/22/20 13:07 EDT

## 2020-09-22 NOTE — H&P
"      [facility] Medicine Services      Patient Name: Judy Cooper  : 1965  MRN: [id number]  Primary Care Physician: Jf, No Known  Date of admission: 2020    Patient Care Team:  Provider, No Known as PCP - General          Subjective   History Present Illness     Chief Complaint: Speech difficulty    Ms. Cooper is a 55 y.o. female with a past medical history of hypothyroidism, hyperlipidemia, GERD, hypertension, and migraines who presented [facility] on 2020 with complaints of speech difficulty.  Patient states last night she was sitting on her couch watching TV and when she tried to talk she could not get out what she wanted to say.  She explained she kept repeating words.  This was accompanied by dizziness and she explained that she felt \"drunk.\"  She has not drank alcohol in 10+ years.  She explains before that she was reading something and it was very blurry.  This episode of the symptoms lasted 20 to 25 minutes and has since subsided.  He denies any aggravating or alleviating factors.  She explains right now she has a migraine, in which she gets about 3 times a month.  She explained that she has not slept and she believes this triggered a migraine.  She denies a family history of CVA or that this is never happened to her before.  She denies any recent nausea, vomiting, diarrhea, fever, chills, cough, shortness of breath, or chest pain.       In the ED, CT head unremarkable.  EKG showed sinus rhythm.  All labs unremarkable upon admission except creatinine 1.2.  All vital signs stable upon admission.    Review of Systems   Constitution: Negative.   HENT: Negative.    Eyes: Positive for blurred vision.   Cardiovascular: Negative.    Respiratory: Negative.    Skin: Negative.    Musculoskeletal: Negative.    Gastrointestinal: Negative.    Genitourinary: Negative.    Neurological: Positive for dizziness.        Speech difficulty, migraine   Psychiatric/Behavioral: " Negative.            Personal History     Past Medical History:   Past Medical History:   Diagnosis Date   • Disease of thyroid gland    • Elevated cholesterol    • GERD (gastroesophageal reflux disease)    • Hypertension    • Migraine 10/9/2019   • Morbid obesity (CMS/HCC) 9/22/2020   • Seasonal allergies 9/22/2020       Surgical History:      Past Surgical History:   Procedure Laterality Date   • COLONOSCOPY     • HYSTERECTOMY     • TONSILLECTOMY             Family History: family history includes Cancer in her father and mother. Otherwise pertinent FHx was reviewed and unremarkable.     Social History:  reports that she has never smoked. She does not have any smokeless tobacco history on file. She reports previous alcohol use. She reports that she does not use drugs.      Medications:  Prior to Admission medications    Medication Sig Start Date End Date Taking? Authorizing Provider   aspirin 81 MG EC tablet Take 81 mg by mouth Daily.   Yes Raiza Rhoades MD   atorvastatin (LIPITOR) 40 MG tablet Take 40 mg by mouth Every Night.   Yes Raiza Rhoades MD   B Complex-C (SUPER B COMPLEX PO) Take 1 tablet by mouth Daily.   Yes Raiza Rhoades MD   cetirizine (zyrTEC) 10 MG tablet Take 10 mg by mouth Daily.   Yes Raiza Rhoades MD   montelukast (SINGULAIR) 10 MG tablet Take 10 mg by mouth Every Night.   Yes Raiza Rhoades MD   NON FORMULARY Take 1 each by mouth Daily. DHEA-5   Yes aRiza Rhoades MD   Progesterone Micronized (PROGESTERONE PO) Take 300 mg by mouth Daily.   Yes Raiza Rhoades MD   vilazodone (VIIBRYD) 40 MG tablet tablet Take 40 mg by mouth Daily.   Yes Raiza Rhoades MD       Allergies:    Allergies   Allergen Reactions   • Tetanus Toxoids Anaphylaxis       Objective   Objective     Vital Signs  Temp:  [97.9 °F (36.6 °C)-98.1 °F (36.7 °C)] 97.9 °F (36.6 °C)  Heart Rate:  [60-79] 62  Resp:  [16-21] 21  BP: (147-160)/(61-87) 148/66  SpO2:  [92 %-98 %] 96  %  on   ;   Device (Oxygen Therapy): room air  Body mass index is 42.7 kg/m².    Physical Exam  Constitutional:       Appearance: Normal appearance. She is obese.   HENT:      Head: Normocephalic and atraumatic.      Nose: Nose normal.      Mouth/Throat:      Mouth: Mucous membranes are moist.      Pharynx: Oropharynx is clear.   Eyes:      Conjunctiva/sclera: Conjunctivae normal.      Pupils: Pupils are equal, round, and reactive to light.   Neck:      Musculoskeletal: Normal range of motion.   Cardiovascular:      Rate and Rhythm: Normal rate.   Pulmonary:      Effort: Pulmonary effort is normal.      Breath sounds: Normal breath sounds.   Abdominal:      General: Abdomen is flat. Bowel sounds are normal.      Palpations: Abdomen is soft.   Musculoskeletal: Normal range of motion.   Skin:     General: Skin is warm and dry.   Neurological:      General: No focal deficit present.      Mental Status: She is alert and oriented to person, place, and time. Mental status is at baseline.   Psychiatric:         Mood and Affect: Mood normal.         Behavior: Behavior normal.         Thought Content: Thought content normal.         Judgment: Judgment normal.         Results Review:  I have personally reviewed most recent cardiac tracings, lab results and radiology images and interpretations and agree with findings.    Results from last 7 days   Lab Units 09/21/20  2311   WBC 10*3/mm3 8.50   HEMOGLOBIN g/dL 13.1   HEMATOCRIT % 39.5   PLATELETS 10*3/mm3 341     Results from last 7 days   Lab Units 09/21/20  2310   SODIUM mmol/L 144   POTASSIUM mmol/L 4.0   CHLORIDE mmol/L 108*   CO2 mmol/L 27.0   BUN  13   CREATININE mg/dL 1.24*   GLUCOSE mg/dL 82   CALCIUM mg/dL 9.0     Estimated Creatinine Clearance: 65.2 mL/min (A) (by C-G formula based on SCr of 1.24 mg/dL (H)).  Brief Urine Lab Results     None          Microbiology Results (last 10 days)     ** No results found for the last 240 hours. **          ECG/EMG Results (most  recent)     Procedure Component Value Units Date/Time    ECG 12 Lead [[id number]] Collected: 09/21/20 2314     Updated: 09/21/20 2317    Narrative:      HEART RATE= 68  bpm  RR Interval= 888  ms  [place] Interval= 155  ms  P Horizontal Axis= 30  deg  P Front Axis= 35  deg  QRSD Interval= 94  ms  QT Interval= 373  ms  QRS Axis= -23  deg  T Wave Axis= 30  deg  - NORMAL ECG -  Sinus rhythm  No previous ECG available for comparison  Electronically Signed By:   Date and Time of Study: 2020-09-21 23:14:23                    Ct Head Without Contrast    Result Date: 9/21/2020  No acute intracranial abnormality. Electronically signed by:  Chente Curry M.D.  9/21/2020 10:07 PM        Estimated Creatinine Clearance: 65.2 mL/min (A) (by C-G formula based on SCr of 1.24 mg/dL (H)).    Assessment/Plan   Assessment/Plan       Active Hospital Problems    Diagnosis  POA   • **Difficulty with speech [R47.9]  Yes     Priority: High   • DEVON (acute kidney injury) (CMS/[facility]) [N17.9]  Yes     Priority: High   • Morbid obesity (CMS/[facility]) [E66.01]  Yes   • GERD without esophagitis [K21.9]  Yes   • Seasonal allergies [J30.2]  Yes   • Menopause [Z78.0]  Yes   • Migraine [G43.909]  Yes   • Hypothyroidism [E03.9]  Yes   • Hyperlipidemia [E78.5]  Yes   • HTN (hypertension) [I10]  Yes      Resolved Hospital Problems   No resolved problems to display.     Difficulty with speech  - Rule out CVA, symptoms have resolved, likely TIA    - CT head unremarkable.    - EKG showed sinus rhythm.  - TSH 1.84  - Lipid panel reviewed  - Hbg A1C pending   - Vitamin B 12 pending   - Continuous cardiac monitoring  - MRI brain and CTA ordered     - PT/OT/ST consulted  - Aspirin and statin ordered  - Neurochecks  - Neurology consulted    Acute kidney injury   -Creatinine 1.2, monitor-mild   -Baseline creatinine unknown   -Avoid nephrotoxic medications   -Normal saline 500 mL bolus ordered x1    Essential hypertension  -No reported home medications, but She reports  this  -Monitor blood pressure  - Start amlodipine 10mg daily     Hypothyroidism  - Continue levothyroxine   - TSH 1.84    Hyperlipidemia  -Continue statin    Seasonal allergies  -Continue Zyrtec and Singulair    Depression  - Stable  -Continue Viibryd    Migraine  - Imitrex ordered     Menopause  - on DHEA and progesterone at home     Morbid obesity  -BMI 42.7  - Encourage lifestyle modifications           VTE Prophylaxis - Lovenox subcutaneous  Mechanical Order History:      Ordered        09/22/20 0222  Place Sequential Compression Device  Once         09/22/20 0222  Maintain Sequential Compression Device  Continuous                 Pharmalogical Order History:     None          CODE STATUS:    Code Status and Medical Interventions:   Ordered at: 09/22/20 0223     Code Status:    CPR     Medical Interventions (Level of Support Prior to Arrest):    Full       This patient has been examined wearing appropriate Personal Protective Equipment. 09/22/20      I discussed the patient's findings and my recommendations with patient and nursing staff.        Electronically signed by ZAHRA Jensen, 09/22/20, 7:23 AM EDT.  [facility] Hospitalist Team

## 2020-09-22 NOTE — DISCHARGE INSTRUCTIONS
Per Neurology's note, you had a TIA or transient ischemic attack.    You personal risk factors for stroke include:    Elevated triglycerides at 173, normal range is 150 or less.  Body weight is elevated with a body mass index of 42.7, normal range is 18-25..

## 2020-09-22 NOTE — THERAPY EVALUATION
Patient Name: Judy Cooper  : 1965    MRN: [id number]                              Today's Date: 2020       Admit Date: 2020    Visit Dx:     ICD-10-CM ICD-9-CM   1. Confusion  R41.0 298.9   2. Dysarthria  R47.1 784.51     Patient Active Problem List   Diagnosis   • Depressive disorder   • Hashimoto's thyroiditis   • HTN (hypertension)   • Migraine   • Pituitary adenoma (CMS/HCC)   • Hyperlipidemia   • Hypothyroidism   • Morbid obesity (CMS/HCC)   • GERD without esophagitis   • DEVON (acute kidney injury) (CMS/HCC)   • Difficulty with speech   • Seasonal allergies   • Menopause     Past Medical History:   Diagnosis Date   • Disease of thyroid gland    • Elevated cholesterol    • GERD (gastroesophageal reflux disease)    • Hypertension    • Migraine 10/9/2019   • Morbid obesity (CMS/HCC) 2020   • Seasonal allergies 2020     Past Surgical History:   Procedure Laterality Date   • COLONOSCOPY     • HYSTERECTOMY     • TONSILLECTOMY       General Information     Row Name 20 1147          Physical Therapy Time and Intention    Document Type  evaluation  -CR     Mode of Treatment  co-treatment;physical therapy;occupational therapy  -CR     Row Name 20 1147          General Information    Patient Profile Reviewed  yes  -CR     Prior Level of Function  independent:;all household mobility;community mobility  -CR     Existing Precautions/Restrictions  no known precautions/restrictions  -CR     Row Name 20 1147          Living Environment    Lives With  spouse  -CR     Row Name 20 1147          Home Main Entrance    Number of Stairs, Main Entrance  three  -CR     Row Name 20 1147          Stairs Within Home, Primary    Number of Stairs, Within Home, Primary  none  -CR     Row Name 20 1147          Cognition    Orientation Status (Cognition)  oriented x 4  -CR     Row Name 20 1147          Safety Issues, Functional Mobility    Impairments Affecting Function  (Mobility)  pain headache  -CR       User Key  (r) = Recorded By, (t) = Taken By, (c) = Cosigned By    Initials Name Provider Type    CR Reyes, Carmela, PT Physical Therapist        Mobility     Row Name 09/22/20 1151          Bed Mobility    Bed Mobility  supine-sit-supine  -CR     Supine-Sit-Supine [place] (Bed Mobility)  modified independence  -CR     Row Name 09/22/20 1151          Sit-Stand Transfer    Sit-Stand [place] (Transfers)  standby assist  -CR     Row Name 09/22/20 1151          Gait/Stairs (Locomotion)    [place] Level (Gait)  supervision  -CR     Distance in Feet (Gait)  40  -CR       User Key  (r) = Recorded By, (t) = Taken By, (c) = Cosigned By    Initials Name Provider Type    CR Reyes, Carmela, PT Physical Therapist        Obj/Interventions     Row Name 09/22/20 1151          Range of Motion Comprehensive    General Range of Motion  bilateral lower extremity ROM WFL  -CR     Row Name 09/22/20 1151          Strength Comprehensive (MMT)    General Manual Muscle Testing (MMT) Assessment  no strength deficits identified  -CR     Row Name 09/22/20 1151          Balance    Balance Assessment  sitting static balance;sitting dynamic balance;standing static balance  -CR     Static Sitting Balance  WFL  -CR     Dynamic Sitting Balance  WFL  -CR     Static Standing Balance  WFL  -CR     Row Name 09/22/20 1151          Sensory Assessment (Somatosensory)    Sensory Assessment (Somatosensory)  LE sensation intact  -CR       User Key  (r) = Recorded By, (t) = Taken By, (c) = Cosigned By    Initials Name Provider Type    CR Reyes, Carmela, PT Physical Therapist        Goals/Plan    No documentation.       Clinical Impression     Row Name 09/22/20 1152          Pain    Additional Documentation  Pain Scale: Numbers Pre/Post-Treatment (Group)  -CR     Row Name 09/22/20 1152          Pain Scale: Numbers Pre/Post-Treatment    Pretreatment Pain Rating  6/10  -CR     Posttreatment Pain Rating  6/10  -CR      Pain Location  head  -CR     Pain Intervention(s)  Medication (See MAR)  -CR     Row Name 09/22/20 1152          Plan of Care Review    Plan of Care Reviewed With  patient  -CR     Outcome Summary  54 y/o female admitted on 9/21 due to speech deficits which has resolved. MRI reveal no acute ischemia. PMH includes migraines, htn, hld. At time of eval, pt is reporting of headache. Pt does not need physical assistance with bed mobility and transfers. Pt able to ambulate 40 ft without gait deviation noted. No weakness noted to either LE. Pt should be safe to d/c home. PPE gloves, mask with shield.  -CR     Row Name 09/22/20 1152          Therapy Assessment/Plan (PT)    Patient/Family Therapy Goals Statement (PT)  home  -CR     Row Name 09/22/20 1152          Positioning and Restraints    Pre-Treatment Position  in bed  -CR     Post Treatment Position  bed  -CR     In Bed  notified nsg;supine;call light within reach  -CR       User Key  (r) = Recorded By, (t) = Taken By, (c) = Cosigned By    Initials Name Provider Type    CR Reyes, Carmela, PT Physical Therapist        Outcome Measures     Row Name 09/22/20 1207          Modified [place] Scale    Modified [place] Scale  1 - No significant disability despite symptoms.  Able to carry out all usual duties and activities.  -CR     Row Name 09/22/20 1207          Functional Assessment    Outcome Measure Options  Modified [place]  -CR       User Key  (r) = Recorded By, (t) = Taken By, (c) = Cosigned By    Initials Name Provider Type    CR Reyes, Carmela, PT Physical Therapist        Physical Therapy Education                 Title: PT OT SLP Therapies (Resolved)     Topic: Physical Therapy (Resolved)     Point: Mobility training (Resolved)     Learning Progress Summary           Patient Acceptance, E, VU by CR at 9/22/2020 1208                               User Key     Initials Effective Dates Name Provider Type [facility] [place][note 2] 03/01/19 -  Reyes, Carmela, PT Physical  Therapist PT              PT Recommendation and Plan     Plan of Care Reviewed With: patient  Outcome Summary: 56 y/o female admitted on 9/21 due to speech deficits which has resolved. MRI reveal no acute ischemia. PMH includes migraines, htn, hld. At time of eval, pt is reporting of headache. Pt does not need physical assistance with bed mobility and transfers. Pt able to ambulate 40 ft without gait deviation noted. No weakness noted to either LE. Pt should be safe to d/c home. PPE gloves, mask with shield.     Time Calculation:   PT Charges     Row Name 09/22/20 1208             Time Calculation    Start Time  1040  -CR      Stop Time  1055  -CR      Time Calculation (min)  15 min  -CR      PT Received On  09/22/20  -CR         Time Calculation- PT    Total Timed Code Minutes- PT  0 minute(s)  -CR        User Key  (r) = Recorded By, (t) = Taken By, (c) = Cosigned By    Initials Name Provider Type    CR Reyes, Carmela, PT Physical Therapist        Therapy Charges for Today     Code Description Service Date Service Provider Modifiers Qty    [id number]  PT EVAL LOW COMPLEXITY 3 9/22/2020 Reyes, Carmela, ARISTEO GP 1          PT G-Codes  Outcome Measure Options: Modified Ting  Modified Ting Scale: 1 - No significant disability despite symptoms.  Able to carry out all usual duties and activities.    Carmela Reyes, PT  9/22/2020

## 2020-09-22 NOTE — ED PROVIDER NOTES
Subjective   Patient is a 55-year-old female who had an episode this evening at home of difficulty with her speech.  She states she had clear thoughts but can get her thoughts into words and she was repetitive.  She also felt dizzy during that episode.  This lasted approximately 20 minutes.  She states he is at her baseline at this time without complaint.          Review of Systems  Negative for headache earache sore throat cough fever chest pain shortness of breath abdominal pain vomiting diarrhea dysuria case weight loss or other complaint.  A complete a systems was obtained and is otherwise negative  No past medical history on file.    Allergies   Allergen Reactions   • Tetanus Toxoids Anaphylaxis       No past surgical history on file.    No family history on file.    Social History     Socioeconomic History   • Marital status:      Spouse name: Not on file   • Number of children: Not on file   • Years of education: Not on file   • Highest education level: Not on file           Objective   Physical Exam  Neurologic exam is nonfocal.  HEENT exam shows TMs to be clear.  Oropharynx comers but sclera nonicteric.  Neck has no adenopathy JVD or bruits.  Lungs are clear.  Heart has regular rate rhythm without murmur rub or gallop.  Chest is nontender.  Abdomen is soft nontender.  Extremity exam is no cyanosis or edema.  Procedures     My EKG interpretation shows normal sinus rhythm with no acute ST change      ED Course            Results for orders placed or performed during the hospital encounter of 09/21/20   Basic Metabolic Panel    Specimen: Blood   Result Value Ref Range    Glucose 82 65 - 99 mg/dL    BUN      Creatinine 1.24 (H) 0.57 - 1.00 mg/dL    Sodium 144 136 - 145 mmol/L    Potassium 4.0 3.5 - 5.2 mmol/L    Chloride 108 (H) 98 - 107 mmol/L    CO2 27.0 22.0 - 29.0 mmol/L    Calcium 9.0 8.6 - 10.5 mg/dL    eGFR Non African Amer 45 (L) >60 mL/min/1.73    BUN/Creatinine Ratio      Anion Gap 9.0 5.0 -  15.0 mmol/L   CBC Auto Differential    Specimen: Blood   Result Value Ref Range    WBC 8.50 3.40 - 10.80 10*3/mm3    RBC 4.57 3.77 - 5.28 10*6/mm3    Hemoglobin 13.1 12.0 - 15.9 g/dL    Hematocrit 39.5 34.0 - 46.6 %    MCV 86.5 79.0 - 97.0 fL    MCH 28.8 26.6 - 33.0 pg    MCHC 33.3 31.5 - 35.7 g/dL    RDW 13.6 12.3 - 15.4 %    RDW-SD 40.7 37.0 - 54.0 fl    MPV 7.6 6.0 - 12.0 fL    Platelets 341 140 - 450 10*3/mm3    Neutrophil % 51.7 42.7 - 76.0 %    Lymphocyte % 33.4 19.6 - 45.3 %    Monocyte % 10.4 5.0 - 12.0 %    Eosinophil % 3.9 0.3 - 6.2 %    Basophil % 0.6 0.0 - 1.5 %    Neutrophils, Absolute 4.40 1.70 - 7.00 10*3/mm3    Lymphocytes, Absolute 2.80 0.70 - 3.10 10*3/mm3    Monocytes, Absolute 0.90 0.10 - 0.90 10*3/mm3    Eosinophils, Absolute 0.30 0.00 - 0.40 10*3/mm3    Basophils, Absolute 0.00 0.00 - 0.20 10*3/mm3    nRBC 0.1 0.0 - 0.2 /100 WBC   BUN    Specimen: Blood   Result Value Ref Range    BUN 13 6 - 20 mg/dL   POC Glucose Once    Specimen: Blood   Result Value Ref Range    Glucose 89 70 - 105 mg/dL     Ct Head Without Contrast    Result Date: 9/21/2020  No acute intracranial abnormality. Electronically signed by:  Chente Curry M.D.  9/21/2020 10:07 PM                                      [facility]  Number of Diagnoses or Management Options  Diagnosis management comments: Patient has benign physical exam.  Her NIH is 0.  She has normal CT scan.  She has no focal neurologic deficits.  Metabolic panel is normal.  There is no evidence of infectious process.  Patient was brought in the hospital for neurologic observation and probable MRI scan in the morning.  I did speak to the on-call hospitalist.    Risk of Complications, Morbidity, and/or Mortality  Presenting problems: high  Diagnostic procedures: high  Management options: high    Patient Progress  Patient progress: stable      Final diagnoses:   Confusion   Dysarthria            Tom Maxwell MD  09/22/20 0021

## 2020-09-22 NOTE — PLAN OF CARE
Problem: Adult Inpatient Plan of Care  Goal: Plan of Care Review  Outcome: Ongoing, Progressing  Goal: Patient-Specific Goal (Individualized)  Outcome: Ongoing, Progressing  Goal: Absence of Hospital-Acquired Illness or Injury  Outcome: Ongoing, Progressing  Intervention: Identify and Manage Fall Risk  Recent Flowsheet Documentation  Taken 9/22/2020 0442 by Jus Maya RN  Safety Promotion/Fall Prevention:   activity supervised   assistive device/personal items within reach   clutter free environment maintained   fall prevention program maintained   nonskid shoes/slippers when out of bed   safety round/check completed  Taken 9/22/2020 0208 by Jus Maya RN  Safety Promotion/Fall Prevention:   activity supervised   assistive device/personal items within reach   clutter free environment maintained   fall prevention program maintained   muscle strengthening facilitated   nonskid shoes/slippers when out of bed   mobility aid in reach   safety round/check completed  Intervention: Prevent Skin Injury  Recent Flowsheet Documentation  Taken 9/22/2020 0442 by Jus Maya RN  Body Position: position changed independently  Taken 9/22/2020 0208 by Jus Maya RN  Body Position: position changed independently  Intervention: Prevent and Manage VTE (venous thromboembolism) Risk  Recent Flowsheet Documentation  Taken 9/22/2020 0208 by Jus Maya RN  VTE Prevention/Management:   sequential compression devices on   bilateral  Intervention: Prevent Infection  Recent Flowsheet Documentation  Taken 9/22/2020 0442 by Jus Maya RN  Infection Prevention:   environmental surveillance performed   rest/sleep promoted   single patient room provided  Taken 9/22/2020 0208 by Jus Maya RN  Infection Prevention:   environmental surveillance performed   rest/sleep promoted   single patient room provided  Goal: Optimal Comfort and Wellbeing  Outcome: Ongoing, Progressing  Intervention: Provide  Person-Centered Care  Recent Flowsheet Documentation  Taken 9/22/2020 0442 by Jus Maya RN  Trust Relationship/Rapport:   care explained   choices provided   emotional support provided   empathic listening provided   questions answered   questions encouraged   reassurance provided   thoughts/feelings acknowledged  Taken 9/22/2020 0208 by Jus Maya RN  Trust Relationship/Rapport:   care explained   choices provided   emotional support provided   empathic listening provided   questions answered   questions encouraged   reassurance provided   thoughts/feelings acknowledged  Goal: Readiness for Transition of Care  Outcome: Ongoing, Progressing  Intervention: Mutually Develop Transition Plan  Recent Flowsheet Documentation  Taken 9/22/2020 0222 by Jus Maya RN  Transportation Anticipated: family or friend will provide  Patient/Family Anticipated Services at Transition: none  Patient/Family Anticipates Transition to: home with family  Taken 9/22/2020 0220 by Jus Maya RN  Equipment Currently Used at Home: none   Goal Outcome Evaluation:

## 2020-09-22 NOTE — THERAPY EVALUATION
Patient Name: Judy Cooper  : 1965    MRN: [id number]                              Today's Date: 2020       Admit Date: 2020    Visit Dx:     ICD-10-CM ICD-9-CM   1. Confusion  R41.0 298.9   2. Dysarthria  R47.1 784.51     Patient Active Problem List   Diagnosis   • Depressive disorder   • Hashimoto's thyroiditis   • HTN (hypertension)   • Migraine   • Pituitary adenoma (CMS/[facility])   • Hyperlipidemia   • Hypothyroidism   • Morbid obesity (CMS/HCC)   • GERD without esophagitis   • DEVON (acute kidney injury) (CMS/[facility])   • Difficulty with speech   • Seasonal allergies   • Menopause     Past Medical History:   Diagnosis Date   • Disease of thyroid gland    • Elevated cholesterol    • GERD (gastroesophageal reflux disease)    • Hypertension    • Migraine 10/9/2019   • Morbid obesity (CMS/HCC) 2020   • Seasonal allergies 2020     Past Surgical History:   Procedure Laterality Date   • COLONOSCOPY     • HYSTERECTOMY     • TONSILLECTOMY       General Information     Row Name 20 1331          OT Time and Intention    Document Type  evaluation  -MUKESH     Mode of Treatment  co-treatment;physical therapy  -MUKESH     Row Name 20 1331          General Information    Patient Profile Reviewed  yes  -MUKESH     Prior Level of Function  independent:;ADL's;all household mobility  -MUKESH     Existing Precautions/Restrictions  no known precautions/restrictions  -MUKESH     Barriers to Rehab  none identified  -MUKESH     Row Name 20 1331          Living Environment    Lives With  spouse  -MUKESH     Row Name 20 1331          Home Main Entrance    Number of Stairs, Main Entrance  three  -MUKESH     Row Name 20 1331          Cognition    Orientation Status (Cognition)  oriented x 4  -MUKESH     Row Name 20 1331          Safety Issues, Functional Mobility    Impairments Affecting Function (Mobility)  pain  -MUKESH       User Key  (r) = Recorded By, (t) = Taken By, (c) = Cosigned By    Initials Name Provider Type    MUKESH  Maegan Trammell OT Occupational Therapist        Mobility/ADL's     [facility] Name 09/22/20 1332          Bed Mobility    Bed Mobility  supine-sit-supine  -     Supine-Sit-Supine [place] (Bed Mobility)  modified independence  -[facility] Name 09/22/20 1332          Functional Mobility    Functional Mobility- Ind. Level  standby assist  -     Functional Mobility-Distance (Feet)  10  -[facility] Name 09/22/20 1332          Activities of Daily Living    BADL Assessment/Intervention  lower body dressing;toileting  -MUKESH     Row Name 09/22/20 1332          Lower Body Dressing Assessment/Training    [place] Level (Lower Body Dressing)  doff;don;socks;modified independence  -     Position (Lower Body Dressing)  edge of bed sitting  -[facility] Name 09/22/20 1332          Toileting Assessment/Training    [place] Level (Toileting)  adjust/manage clothing;perform perineal hygiene;modified independence  -     Position (Toileting)  unsupported sitting  -       User Key  (r) = Recorded By, (t) = Taken By, (c) = Cosigned By    Initials Name Provider Type    Maegan Rodríguez OT Occupational Therapist        Obj/Interventions     [facility] Name 09/22/20 1333          Sensory Assessment (Somatosensory)    Sensory Assessment (Somatosensory)  sensation intact  -[facility] Name 09/22/20 1333          Vision Assessment/Intervention    Visual Impairment/Limitations  WFL  -[facility] Name 09/22/20 1333          Range of Motion Comprehensive    General Range of Motion  no range of motion deficits identified  -[facility] Name 09/22/20 1333          Strength Comprehensive (MMT)    General Manual Muscle Testing (MMT) Assessment  no strength deficits identified  -MUKESH     Row Name 09/22/20 1333          Balance    Static Sitting Balance  WFL  -     Dynamic Sitting Balance  WFL  -MUKESH     Static Standing Balance  WFL  -       User Key  (r) = Recorded By, (t) = Taken By, (c) = Cosigned By    Initials Name Provider Type     Maegan Rodríguez, PIYUSH Occupational Therapist        Goals/Plan    No documentation.       Clinical Impression     Row Name 09/22/20 1333          Pain Scale: Numbers Pre/Post-Treatment    Pretreatment Pain Rating  6/10  -MUKESH     Posttreatment Pain Rating  6/10  -MUKESH     Pain Location  head  -MUKESH     Pain Intervention(s)  Medication (See MAR)  -     Row Name 09/22/20 1333          Plan of Care Review    Plan of Care Reviewed With  patient  -MUKESH     Outcome Summary  Pt is a 54 y/o F presenting with speech difficulties and found to have TIA. MRI was negative for acute ischemia. PMH includes hypothyroidism, hyperlipidemia, GERD, HTN and migraines. Pt indep with ADLs and has returned to baseline level of indep with ADLs. No UE ROM/strength or sensation deficits noted. OT will sign off at this time. Pt safe to discharge home with family. PPE worn: gloves, mask and goggles.  -[facility] Name 09/22/20 1333          Therapy Assessment/Plan (OT)    Criteria for Skilled Therapeutic Interventions Met (OT)  no;no problems identified which require skilled intervention  -     Therapy Frequency (OT)  evaluation only  -[facility] Name 09/22/20 1333          Therapy Plan Review/Discharge Plan (OT)    Anticipated Discharge Disposition (OT)  home  -[facility] Name 09/22/20 1333          Vital Signs    Pre Systolic BP Rehab  150  -MUKESH     Pre Treatment Diastolic BP  60  -MUKESH     Post Systolic BP Rehab  149  -MUKESH     Post Treatment Diastolic BP  76  -MUKESH     Pretreatment Heart Rate (beats/min)  64  -MUKESH     Posttreatment Heart Rate (beats/min)  75  -MUKESH     Pre SpO2 (%)  96  -MUKESH     O2 Delivery Pre Treatment  room air  -MUKESH     Intra SpO2 (%)  96  -MUKESH     O2 Delivery Intra Treatment  room air  -MUKESH     Post SpO2 (%)  97  -MUKESH     O2 Delivery Post Treatment  room air  -MUKESH     Row Name 09/22/20 1333          Positioning and Restraints    Pre-Treatment Position  in bed  -MUKESH     Post Treatment Position  bed  -MUKESH     In Bed  notified  nsg;supine;encouraged to call for assist;call light within reach  -MUKESH       User Key  (r) = Recorded By, (t) = Taken By, (c) = Cosigned By    Initials Name Provider Type    Maegan Rodríguez OT Occupational Therapist        Outcome Measures     Row Name 09/22/20 1337          Modified [place] Scale    Pre-Stroke Modified [place] Scale  0 - No Symptoms at all.  -MUKESH     Modified [place] Scale  1 - No significant disability despite symptoms.  Able to carry out all usual duties and activities.  -MUKESH       User Key  (r) = Recorded By, (t) = Taken By, (c) = Cosigned By    Initials Name Provider Type    Maegan Rodríguez OT Occupational Therapist        Occupational Therapy Education                 Title: PT OT SLP Therapies (Done)     Topic: Occupational Therapy (Done)     Point: ADL training (Done)     Description:   Instruct learner(s) on proper safety adaptation and remediation techniques during self care or transfers.   Instruct in proper use of assistive devices.              Learning Progress Summary           Patient Acceptance, E,D, VU,DU by MUKESH at 9/22/2020 1337                               User Key     Initials Effective Dates Name Provider Type Discipline     07/15/20 -  Maegan Trammell OT Occupational Therapist OT              OT Recommendation and Plan  Retired Outcome Summary/Treatment Plan (OT)  Anticipated Discharge Disposition (OT): home  Therapy Frequency (OT): evaluation only  Plan of Care Review  Plan of Care Reviewed With: patient  Outcome Summary: Pt is a 56 y/o F presenting with speech difficulties and found to have TIA. MRI was negative for acute ischemia. PMH includes hypothyroidism, hyperlipidemia, GERD, HTN and migraines. Pt indep with ADLs and has returned to baseline level of indep with ADLs. No UE ROM/strength or sensation deficits noted. OT will sign off at this time. Pt safe to discharge home with family. PPE worn: gloves, mask and goggles.  Plan of Care Reviewed With:  patient  Outcome Summary: Pt is a 54 y/o F presenting with speech difficulties and found to have TIA. MRI was negative for acute ischemia. PMH includes hypothyroidism, hyperlipidemia, GERD, HTN and migraines. Pt indep with ADLs and has returned to baseline level of indep with ADLs. No UE ROM/strength or sensation deficits noted. OT will sign off at this time. Pt safe to discharge home with family. PPE worn: gloves, mask and goggles.     Time Calculation:   Time Calculation- OT     Row Name 09/22/20 1337             Time Calculation- OT    OT Start Time  1040  -MUKESH      OT Stop Time  1055  -MUKESH      OT Time Calculation (min)  15 min  -MUKESH      Total Timed Code Minutes- OT  15 minute(s)  -MUKESH      OT Received On  09/22/20  -MUKESH        User Key  (r) = Recorded By, (t) = Taken By, (c) = Cosigned By    Initials Name Provider Type    Maegan Rodríguez OT Occupational Therapist        Therapy Charges for Today     Code Description Service Date Service Provider Modifiers Qty    [id number]  OT EVAL LOW COMPLEXITY 3 9/22/2020 Maegan Tramemll OT GO 1               Maegan Trammell OT  9/22/2020

## 2020-09-23 LAB
BH CV ECHO MEAS - ACS: 2.1 CM
BH CV ECHO MEAS - AO MAX PG (FULL): 2.4 MMHG
BH CV ECHO MEAS - AO MAX PG: 5.9 MMHG
BH CV ECHO MEAS - AO MEAN PG (FULL): 1.5 MMHG
BH CV ECHO MEAS - AO MEAN PG: 3.5 MMHG
BH CV ECHO MEAS - AO ROOT AREA (BSA CORRECTED): 1.6
BH CV ECHO MEAS - AO ROOT AREA: 9.2 CM^2
BH CV ECHO MEAS - AO ROOT DIAM: 3.4 CM
BH CV ECHO MEAS - AO V2 MAX: 121.9 CM/SEC
BH CV ECHO MEAS - AO V2 MEAN: 88.6 CM/SEC
BH CV ECHO MEAS - AO V2 VTI: 31.5 CM
BH CV ECHO MEAS - AVA(I,A): 2.1 CM^2
BH CV ECHO MEAS - AVA(I,D): 2.1 CM^2
BH CV ECHO MEAS - AVA(V,A): 2.3 CM^2
BH CV ECHO MEAS - AVA(V,D): 2.3 CM^2
BH CV ECHO MEAS - BSA(HAYCOCK): 2.4 M^2
BH CV ECHO MEAS - BSA: 2.2 M^2
BH CV ECHO MEAS - BZI_BMI: 42.6 KILOGRAMS/M^2
BH CV ECHO MEAS - BZI_METRIC_HEIGHT: 165.1 CM
BH CV ECHO MEAS - BZI_METRIC_WEIGHT: 116.1 KG
BH CV ECHO MEAS - EDV(CUBED): 96.6 ML
BH CV ECHO MEAS - EDV(MOD-SP4): 77.2 ML
BH CV ECHO MEAS - EDV(TEICH): 96.8 ML
BH CV ECHO MEAS - EF(CUBED): 61.8 %
BH CV ECHO MEAS - EF(MOD-BP): 52 %
BH CV ECHO MEAS - EF(MOD-SP4): 52.4 %
BH CV ECHO MEAS - EF(TEICH): 53.4 %
BH CV ECHO MEAS - ESV(CUBED): 36.9 ML
BH CV ECHO MEAS - ESV(MOD-SP4): 36.7 ML
BH CV ECHO MEAS - ESV(TEICH): 45.1 ML
BH CV ECHO MEAS - FS: 27.4 %
BH CV ECHO MEAS - IVS/LVPW: 0.81
BH CV ECHO MEAS - IVSD: 1 CM
BH CV ECHO MEAS - LA DIMENSION(2D): 3.6 CM
BH CV ECHO MEAS - LV DIASTOLIC VOL/BSA (35-75): 35.1 ML/M^2
BH CV ECHO MEAS - LV MASS(C)D: 186.6 GRAMS
BH CV ECHO MEAS - LV MASS(C)DI: 84.9 GRAMS/M^2
BH CV ECHO MEAS - LV MAX PG: 3.6 MMHG
BH CV ECHO MEAS - LV MEAN PG: 1.9 MMHG
BH CV ECHO MEAS - LV SYSTOLIC VOL/BSA (12-30): 16.7 ML/M^2
BH CV ECHO MEAS - LV V1 MAX: 94.6 CM/SEC
BH CV ECHO MEAS - LV V1 MEAN: 64.8 CM/SEC
BH CV ECHO MEAS - LV V1 VTI: 22.4 CM
BH CV ECHO MEAS - LVIDD: 4.6 CM
BH CV ECHO MEAS - LVIDS: 3.3 CM
BH CV ECHO MEAS - LVOT AREA: 3 CM^2
BH CV ECHO MEAS - LVOT DIAM: 1.9 CM
BH CV ECHO MEAS - LVPWD: 1.2 CM
BH CV ECHO MEAS - MV A MAX VEL: 109.1 CM/SEC
BH CV ECHO MEAS - MV DEC SLOPE: 505 CM/SEC^2
BH CV ECHO MEAS - MV DEC TIME: 0.22 SEC
BH CV ECHO MEAS - MV E MAX VEL: 109.9 CM/SEC
BH CV ECHO MEAS - MV E/A: 1
BH CV ECHO MEAS - MV MAX PG: 5.4 MMHG
BH CV ECHO MEAS - MV MEAN PG: 1.9 MMHG
BH CV ECHO MEAS - MV V2 MAX: 116.2 CM/SEC
BH CV ECHO MEAS - MV V2 MEAN: 63.7 CM/SEC
BH CV ECHO MEAS - MV V2 VTI: 32.9 CM
BH CV ECHO MEAS - MVA(VTI): 2 CM^2
BH CV ECHO MEAS - PA MAX PG: 3.3 MMHG
BH CV ECHO MEAS - PA V2 MAX: 88.6 CM/SEC
BH CV ECHO MEAS - RVDD: 2.7 CM
BH CV ECHO MEAS - SI(AO): 131.4 ML/M^2
BH CV ECHO MEAS - SI(CUBED): 27.2 ML/M^2
BH CV ECHO MEAS - SI(LVOT): 30.5 ML/M^2
BH CV ECHO MEAS - SI(MOD-SP4): 18.4 ML/M^2
BH CV ECHO MEAS - SI(TEICH): 23.5 ML/M^2
BH CV ECHO MEAS - SV(AO): 288.6 ML
BH CV ECHO MEAS - SV(CUBED): 59.7 ML
BH CV ECHO MEAS - SV(LVOT): 66.9 ML
BH CV ECHO MEAS - SV(MOD-SP4): 40.5 ML
BH CV ECHO MEAS - SV(TEICH): 51.7 ML

## 2020-09-23 NOTE — PAYOR COMM NOTE
"Admit note     Md notes attached.   ER admit.   ---------  AUTHORIZATION PENDING:   PLEASE FAX OR CALL DETERMINATION TO CONTACT BELOW:       THANK YOU,    SORIN Peterson, RN  Utilization Review  [facility]  Phone: [phone]  Fax: [phone]      NPI: [id number]  Tax ID: [id number]    Judy Palma (55 y.o. Female)     Date of Birth Social Security Number Address Home Phone MRN    1965  [id number]  [place] [address] [phone] [id number]    [place] Marital Status          Gnosticism        Admission Date Admission Type Admitting Provider Attending Provider Department, Room/Bed    9/21/20 Emergency Dread Thompson MD  [facility] NEURO HEART, 261/1    Discharge Date Discharge Disposition Discharge Destination        9/22/2020 Home or Self Care              Attending Provider: (none)   Allergies: Tetanus Toxoids    Isolation: None   Infection: None   Code Status: Prior    Ht: 165.1 cm (65\")   Wt: 116 kg (256 lb)    Admission Cmt: None   Principal Problem: Difficulty with speech [R47.9]                 Active Insurance as of 9/21/2020     Primary Coverage     Payor Plan Insurance Group Employer/Plan Group    [employer] [facility] [id number]     Payor Plan Address Payor Plan Phone Number Payor Plan Fax Number Effective Dates    [address]   3/1/2020 - None Entered    [facility] [id number]       Subscriber Name Subscriber Birth Date Member ID       JUDY PALMA 1965 [id number]                 Emergency Contacts      (Rel.) Home Phone Work Phone Mobile Phone    DONI FLEMING (Daughter) -- -- [phone]               History & Physical      Natali De La Cruz, APRN at 09/22/20 0723     Attestation signed by Dread Thompson MD at 09/22/20 1624    Hospitalist Physician Assessment/Plan    And is currently pending.  She hadPatient seen and examined    Backslash she had multiple " "problems  Hypertension hypothyroidism and hormonal imbalance.  She had been on DHEA as well as pregnenolone.  Her speech symptoms have resolved now.  No new other neurologic symptoms or signs on exam now  MRI negative and treatment have been adjusted with neurology service to Plavix and high-dose statin.  Patient reports taking lisinopril at home.  Norvasc was added here.  Patient may go back on her home lisinopril and adjust medications with primary care physician.  She will need follow-up with neurology and may need EEG if her symptoms are clear.  Advised not to drive.  She was cleared for discharge by neurology and will need follow-up on her echocardiogram with her primary care physician.      Attending Physician Attestation    For this patient encounter, I have reviewed the mid-level provider documentation, medical decision making and treatment plan, and I have personally spent time with the patient.  All procedures were done by me, and/or all procedures were performed by the mid-level under my supervision.                         [facility]      Patient Name: Judy Cooper  : 1965  MRN: [id number]  Primary Care Physician: Provider, No Known  Date of admission: 2020    Patient Care Team:  Provider, No Known as PCP - General          Subjective   History Present Illness     Chief Complaint: Speech difficulty    Ms. Cooper is a 55 y.o. female with a past medical history of hypothyroidism, hyperlipidemia, GERD, hypertension, and migraines who presented [facility] on 2020 with complaints of speech difficulty.  Patient states last night she was sitting on her couch watching TV and when she tried to talk she could not get out what she wanted to say.  She explained she kept repeating words.  This was accompanied by dizziness and she explained that she felt \"drunk.\"  She has not drank alcohol in 10+ years.  She explains before that she was reading something " and it was very blurry.  This episode of the symptoms lasted 20 to 25 minutes and has since subsided.  He denies any aggravating or alleviating factors.  She explains right now she has a migraine, in which she gets about 3 times a month.  She explained that she has not slept and she believes this triggered a migraine.  She denies a family history of CVA or that this is never happened to her before.  She denies any recent nausea, vomiting, diarrhea, fever, chills, cough, shortness of breath, or chest pain.       In the ED, CT head unremarkable.  EKG showed sinus rhythm.  All labs unremarkable upon admission except creatinine 1.2.  All vital signs stable upon admission.    Review of Systems   Constitution: Negative.   HENT: Negative.    Eyes: Positive for blurred vision.   Cardiovascular: Negative.    Respiratory: Negative.    Skin: Negative.    Musculoskeletal: Negative.    Gastrointestinal: Negative.    Genitourinary: Negative.    Neurological: Positive for dizziness.        Speech difficulty, migraine   Psychiatric/Behavioral: Negative.            Personal History     Past Medical History:   Past Medical History:   Diagnosis Date   • Disease of thyroid gland    • Elevated cholesterol    • GERD (gastroesophageal reflux disease)    • Hypertension    • Migraine 10/9/2019   • Morbid obesity (CMS/[facility]) 9/22/2020   • Seasonal allergies 9/22/2020       Surgical History:      Past Surgical History:   Procedure Laterality Date   • COLONOSCOPY     • HYSTERECTOMY     • TONSILLECTOMY             Family History: family history includes Cancer in her father and mother. Otherwise pertinent FHx was reviewed and unremarkable.     Social History:  reports that she has never smoked. She does not have any smokeless tobacco history on file. She reports previous alcohol use. She reports that she does not use drugs.      Medications:  Prior to Admission medications    Medication Sig Start Date End Date Taking? Authorizing Provider    aspirin 81 MG EC tablet Take 81 mg by mouth Daily.   Yes Raiza Rhoades MD   atorvastatin (LIPITOR) 40 MG tablet Take 40 mg by mouth Every Night.   Yes Raiza Rhoades MD   B Complex-C (SUPER B COMPLEX PO) Take 1 tablet by mouth Daily.   Yes Raiza Rhoades MD   cetirizine (zyrTEC) 10 MG tablet Take 10 mg by mouth Daily.   Yes Raiza Rhoades MD   montelukast (SINGULAIR) 10 MG tablet Take 10 mg by mouth Every Night.   Yes Raiza Rhoades MD   NON FORMULARY Take 1 each by mouth Daily. DHEA-5   Yes Jf, MD Raiza   Progesterone Micronized (PROGESTERONE PO) Take 300 mg by mouth Daily.   Yes Raiza Rhoades MD   vilazodone (VIIBRYD) 40 MG tablet tablet Take 40 mg by mouth Daily.   Yes Raiza Rhoades MD       Allergies:    Allergies   Allergen Reactions   • Tetanus Toxoids Anaphylaxis       Objective   Objective     Vital Signs  Temp:  [97.9 °F (36.6 °C)-98.1 °F (36.7 °C)] 97.9 °F (36.6 °C)  Heart Rate:  [60-79] 62  Resp:  [16-21] 21  BP: (147-160)/(61-87) 148/66  SpO2:  [92 %-98 %] 96 %  on   ;   Device (Oxygen Therapy): room air  Body mass index is 42.7 kg/m².    Physical Exam  Constitutional:       Appearance: Normal appearance. She is obese.   HENT:      Head: Normocephalic and atraumatic.      Nose: Nose normal.      Mouth/Throat:      Mouth: Mucous membranes are moist.      Pharynx: Oropharynx is clear.   Eyes:      Conjunctiva/sclera: Conjunctivae normal.      Pupils: Pupils are equal, round, and reactive to light.   Neck:      Musculoskeletal: Normal range of motion.   Cardiovascular:      Rate and Rhythm: Normal rate.   Pulmonary:      Effort: Pulmonary effort is normal.      Breath sounds: Normal breath sounds.   Abdominal:      General: Abdomen is flat. Bowel sounds are normal.      Palpations: Abdomen is soft.   Musculoskeletal: Normal range of motion.   Skin:     General: Skin is warm and dry.   Neurological:      General: No focal deficit present.       Mental Status: She is alert and oriented to person, place, and time. Mental status is at baseline.   Psychiatric:         Mood and Affect: Mood normal.         Behavior: Behavior normal.         Thought Content: Thought content normal.         Judgment: Judgment normal.         Results Review:  I have personally reviewed most recent cardiac tracings, lab results and radiology images and interpretations and agree with findings.    Results from last 7 days   Lab Units 09/21/20  2311   WBC 10*3/mm3 8.50   HEMOGLOBIN g/dL 13.1   HEMATOCRIT % 39.5   PLATELETS 10*3/mm3 341     Results from last 7 days   Lab Units 09/21/20  2310   SODIUM mmol/L 144   POTASSIUM mmol/L 4.0   CHLORIDE mmol/L 108*   CO2 mmol/L 27.0   BUN  13   CREATININE mg/dL 1.24*   GLUCOSE mg/dL 82   CALCIUM mg/dL 9.0     Estimated Creatinine Clearance: 65.2 mL/min (A) (by C-G formula based on SCr of 1.24 mg/dL (H)).  Brief Urine Lab Results     None          Microbiology Results (last 10 days)     ** No results found for the last 240 hours. **          ECG/EMG Results (most recent)     Procedure Component Value Units Date/Time    ECG 12 Lead [[id number]] Collected: 09/21/20 2314     Updated: 09/21/20 2317    Narrative:      HEART RATE= 68  bpm  RR Interval= 888  ms  LA Interval= 155  ms  P Horizontal Axis= 30  deg  P Front Axis= 35  deg  QRSD Interval= 94  ms  QT Interval= 373  ms  QRS Axis= -23  deg  T Wave Axis= 30  deg  - NORMAL ECG -  Sinus rhythm  No previous ECG available for comparison  Electronically Signed By:   Date and Time of Study: 2020-09-21 23:14:23                    Ct Head Without Contrast    Result Date: 9/21/2020  No acute intracranial abnormality. Electronically signed by:  Chente Curry M.D.  9/21/2020 10:07 PM        Estimated Creatinine Clearance: 65.2 mL/min (A) (by C-G formula based on SCr of 1.24 mg/dL (H)).    Assessment/Plan   Assessment/Plan       Active Hospital Problems    Diagnosis  POA   • **Difficulty with speech [R47.9]   Yes     Priority: High   • DEVON (acute kidney injury) (CMS/HCC) [N17.9]  Yes     Priority: High   • Morbid obesity (CMS/HCC) [E66.01]  Yes   • GERD without esophagitis [K21.9]  Yes   • Seasonal allergies [J30.2]  Yes   • Menopause [Z78.0]  Yes   • Migraine [G43.909]  Yes   • Hypothyroidism [E03.9]  Yes   • Hyperlipidemia [E78.5]  Yes   • HTN (hypertension) [I10]  Yes      Resolved Hospital Problems   No resolved problems to display.     Difficulty with speech  - Rule out CVA, symptoms have resolved, likely TIA    - CT head unremarkable.    - EKG showed sinus rhythm.  - TSH 1.84  - Lipid panel reviewed  - Hbg A1C pending   - Vitamin B 12 pending   - Continuous cardiac monitoring  - MRI brain and CTA ordered     - PT/OT/ST consulted  - Aspirin and statin ordered  - Neurochecks  - Neurology consulted    Acute kidney injury   -Creatinine 1.2, monitor-mild   -Baseline creatinine unknown   -Avoid nephrotoxic medications   -Normal saline 500 mL bolus ordered x1    Essential hypertension  -No reported home medications, but She reports this  -Monitor blood pressure  - Start amlodipine 10mg daily     Hypothyroidism  - Continue levothyroxine   - TSH 1.84    Hyperlipidemia  -Continue statin    Seasonal allergies  -Continue Zyrtec and Singulair    Depression  - Stable  -Continue Viibryd    Migraine  - Imitrex ordered     Menopause  - on DHEA and progesterone at home     Morbid obesity  -BMI 42.7  - Encourage lifestyle modifications           VTE Prophylaxis - Lovenox subcutaneous  Mechanical Order History:      Ordered        09/22/20 0222  Place Sequential Compression Device  Once         09/22/20 0222  Maintain Sequential Compression Device  Continuous                 Pharmalogical Order History:     None          CODE STATUS:    Code Status and Medical Interventions:   Ordered at: 09/22/20 0223     Code Status:    CPR     Medical Interventions (Level of Support Prior to Arrest):    Full       This patient has been examined  wearing appropriate Personal Protective Equipment. 09/22/20      I discussed the patient's findings and my recommendations with patient and nursing staff.        Electronically signed by ZAHRA Jensen, 09/22/20, 7:23 AM EDT.  [facility] Hospitalist Team          Electronically signed by Dread Thompson MD at 09/22/20 1628          Emergency Department Notes      Helen Zabala LPN at 09/22/20 0000        Patient reports blurred vision and expressive aphasia that started approx. 2030 tonight. Denies any weakness at the time. On ED arrival, All of her symptoms are resolved.       Helen Zabala LPN  09/22/20 0036      Electronically signed by Helen Zablaa LPN at 09/22/20 0036     Tom Maxwell MD at 09/22/20 0018          Subjective   Patient is a 55-year-old female who had an episode this evening at home of difficulty with her speech.  She states she had clear thoughts but can get her thoughts into words and she was repetitive.  She also felt dizzy during that episode.  This lasted approximately 20 minutes.  She states he is at her baseline at this time without complaint.          Review of Systems  Negative for headache earache sore throat cough fever chest pain shortness of breath abdominal pain vomiting diarrhea dysuria case weight loss or other complaint.  A complete a systems was obtained and is otherwise negative  No past medical history on file.    Allergies   Allergen Reactions   • Tetanus Toxoids Anaphylaxis       No past surgical history on file.    No family history on file.    Social History     Socioeconomic History   • Marital status:      Spouse name: Not on file   • Number of children: Not on file   • Years of education: Not on file   • Highest education level: Not on file           Objective   Physical Exam  Neurologic exam is nonfocal.  HEENT exam shows TMs to be clear.  Oropharynx comers but sclera nonicteric.  Neck has no adenopathy JVD or bruits.  Lungs are  clear.  Heart has regular rate rhythm without murmur rub or gallop.  Chest is nontender.  Abdomen is soft nontender.  Extremity exam is no cyanosis or edema.  Procedures    My EKG interpretation shows normal sinus rhythm with no acute ST change      ED Course            Results for orders placed or performed during the hospital encounter of 09/21/20   Basic Metabolic Panel    Specimen: Blood   Result Value Ref Range    Glucose 82 65 - 99 mg/dL    BUN      Creatinine 1.24 (H) 0.57 - 1.00 mg/dL    Sodium 144 136 - 145 mmol/L    Potassium 4.0 3.5 - 5.2 mmol/L    Chloride 108 (H) 98 - 107 mmol/L    CO2 27.0 22.0 - 29.0 mmol/L    Calcium 9.0 8.6 - 10.5 mg/dL    eGFR Non African Amer 45 (L) >60 mL/min/1.73    BUN/Creatinine Ratio      Anion Gap 9.0 5.0 - 15.0 mmol/L   CBC Auto Differential    Specimen: Blood   Result Value Ref Range    WBC 8.50 3.40 - 10.80 10*3/mm3    RBC 4.57 3.77 - 5.28 10*6/mm3    Hemoglobin 13.1 12.0 - 15.9 g/dL    Hematocrit 39.5 34.0 - 46.6 %    MCV 86.5 79.0 - 97.0 fL    MCH 28.8 26.6 - 33.0 pg    MCHC 33.3 31.5 - 35.7 g/dL    RDW 13.6 12.3 - 15.4 %    RDW-SD 40.7 37.0 - 54.0 fl    MPV 7.6 6.0 - 12.0 fL    Platelets 341 140 - 450 10*3/mm3    Neutrophil % 51.7 42.7 - 76.0 %    Lymphocyte % 33.4 19.6 - 45.3 %    Monocyte % 10.4 5.0 - 12.0 %    Eosinophil % 3.9 0.3 - 6.2 %    Basophil % 0.6 0.0 - 1.5 %    Neutrophils, Absolute 4.40 1.70 - 7.00 10*3/mm3    Lymphocytes, Absolute 2.80 0.70 - 3.10 10*3/mm3    Monocytes, Absolute 0.90 0.10 - 0.90 10*3/mm3    Eosinophils, Absolute 0.30 0.00 - 0.40 10*3/mm3    Basophils, Absolute 0.00 0.00 - 0.20 10*3/mm3    nRBC 0.1 0.0 - 0.2 /100 WBC   BUN    Specimen: Blood   Result Value Ref Range    BUN 13 6 - 20 mg/dL   POC Glucose Once    Specimen: Blood   Result Value Ref Range    Glucose 89 70 - 105 mg/dL     Ct Head Without Contrast    Result Date: 9/21/2020  No acute intracranial abnormality. Electronically signed by:  Chente Curry M.D.  9/21/2020 10:07  PM                                      MDM  Number of Diagnoses or Management Options  Diagnosis management comments: Patient has benign physical exam.  Her NIH is 0.  She has normal CT scan.  She has no focal neurologic deficits.  Metabolic panel is normal.  There is no evidence of infectious process.  Patient was brought in the hospital for neurologic observation and probable MRI scan in the morning.  I did speak to the on-call hospitalist.    Risk of Complications, Morbidity, and/or Mortality  Presenting problems: high  Diagnostic procedures: high  Management options: high    Patient Progress  Patient progress: stable      Final diagnoses:   Confusion   Dysarthria            Tom Maxwell MD  09/22/20 0021      Electronically signed by Tom Maxwell MD at 09/22/20 0021         Physician Progress Notes (last 48 hours) (Notes from 09/21/20 0903 through 09/23/20 0903)    No notes of this type exist for this encounter.          Consult Notes (last 48 hours) (Notes from 09/21/20 0903 through 09/23/20 0903)      Gayle Muir APRN at 09/22/20 0949      Consult Orders    1. Inpatient Neurology Consult Stroke [[id number]] ordered by Libra Echols FNP at 09/22/20 0222          Attestation signed by Clau Matt MD at 09/22/20 1602    I have reviewed this documentation and agree.                        Primary Care Provider: Provider, No Known     Consult requested by: FRANCES Flores    Reason for Consultation: Neurological evaluation, stroke     History taken from: patient chart RN    Chief complaint: Speech difficulty     Subjective   SUBJECTIVE:    History of present illness: Per H&P: Ms. Cooper is a 55 y.o. female with a past medical history of hypothyroidism, hyperlipidemia, GERD, hypertension, and migraines who presented [facility] on 9/21/2020 with complaints of speech difficulty.  Patient states last night she was sitting on her couch watching TV and when she tried to talk she could not get out  "what she wanted to say.  She explained she kept repeating words.  This was accompanied by dizziness and she explained that she felt \"drunk.\"  She has not drank alcohol in 10+ years.  She explains before that she was reading something and it was very blurry.  This episode of the symptoms lasted 20 to 25 minutes and has since subsided.  He denies any aggravating or alleviating factors.  She explains right now she has a migraine, in which she gets about 3 times a month.  She explained that she has not slept and she believes this triggered a migraine.  She denies a family history of CVA or that this is never happened to her before.  She denies any recent nausea, vomiting, diarrhea, fever, chills, cough, shortness of breath, or chest pain.     Portions of the above HPI have been copied from previous encounters and edited as appropriate.    Patient states she had some blurred vision followed by word finding difficulty and dizziness.  This lasted altogether around 20-25 minutes before completely resolving.  She did have a headache but not until the next morning and she  does have history of migraines.  Denies focal deficit, visual deficit, ataxia.     Review of Systems   Constitutional: Negative for fatigue.   Eyes: Positive for visual disturbance (Blurry vision).   Cardiovascular: Negative.    Neurological: Positive for speech difficulty and headaches. Negative for dizziness, tremors, seizures, syncope, facial asymmetry, weakness, light-headedness and numbness.          PATIENT HISTORY:  Past Medical History:   Diagnosis Date   • Disease of thyroid gland    • Elevated cholesterol    • GERD (gastroesophageal reflux disease)    • Hypertension    • Migraine 10/9/2019   • Morbid obesity (CMS/[facility]) 9/22/2020   • Seasonal allergies 9/22/2020   ,   Past Surgical History:   Procedure Laterality Date   • COLONOSCOPY     • HYSTERECTOMY     • TONSILLECTOMY     ,   Family History   Problem Relation Age of Onset   • Cancer Mother    • " Cancer Father    ,   Social History     Tobacco Use   • Smoking status: Never Smoker   Substance Use Topics   • Alcohol use: Not Currently     Comment: no alcohol for 10 + years   • Drug use: Never   ,   Medications Prior to Admission   Medication Sig Dispense Refill Last Dose   • aspirin 81 MG EC tablet Take 81 mg by mouth Daily.      • atorvastatin (LIPITOR) 40 MG tablet Take 40 mg by mouth Every Night.      • B Complex-C (SUPER B COMPLEX PO) Take 1 tablet by mouth Daily.      • cetirizine (zyrTEC) 10 MG tablet Take 10 mg by mouth Daily.      • montelukast (SINGULAIR) 10 MG tablet Take 10 mg by mouth Every Night.      • NON FORMULARY Take 1 each by mouth Daily. DHEA-5      • Progesterone Micronized (PROGESTERONE PO) Take 300 mg by mouth Daily.      • vilazodone (VIIBRYD) 40 MG tablet tablet Take 40 mg by mouth Daily.      , Scheduled Meds:  amLODIPine, 10 mg, Oral, Q24H  aspirin, 325 mg, Oral, Daily    Or  aspirin, 300 mg, Rectal, Daily  atorvastatin, 80 mg, Oral, Nightly  cetirizine, 10 mg, Oral, Daily  enoxaparin, 40 mg, Subcutaneous, Q24H  levothyroxine, 75 mcg, Oral, Q AM  montelukast, 10 mg, Oral, Nightly  sodium chloride, 500 mL, Intravenous, Once  sodium chloride, 10 mL, Intravenous, Q12H  vilazodone, 40 mg, Oral, Daily    , Continuous Infusions:   , PRN Meds:  •  acetaminophen **OR** acetaminophen  •  labetalol  •  ondansetron **OR** ondansetron  •  [COMPLETED] Insert peripheral IV **AND** sodium chloride  •  sodium chloride  •  SUMAtriptan, Allergies:  Tetanus toxoids    ________________________________________________________     Objective   OBJECTIVE:    PHYSICAL EXAM:    Constitutional: The patient is in no apparent distress, bright awake and alert. There is no shortness of breath.     PSYCHIATRIC: Mood/affect normal, judgement normal, appropriate    HEENT: There is no tenderness over the temporal arteries bilaterally. Normocephalic, atraumatic.     Chest: Breathing unlabored    Cardiac: Regular rate  and rhythm.     Extremities:  No clubbing, cyanosis or edema.    NEUROLOGICAL:    Cognition:   Fully oriented.  Fund of knowledge excellent.  Concentration and attention normal.   Language normal with normal comprehension, fluent speech, intact repetition and naming.   Short and long term memory appears intact    Cranial nerves;    II - pupils bilaterally equal reacting to light,  No new Visual field deficits;  Fundoscopic exam- Not able to be done, non-dilated exam  III,IV,VI: EOMI with no diplopia  V: Normal facial sensations  VII: No facial asymmetry,  VIII: No New hearing abnormality  IX, X, XI: normal gag and shoulder shrug;  XII: tongue is in the midline.    Sensory:  Intact to light touch in all extremities.     Motor: Strength 5/5 bilaterally upper and lower extremities. No involuntary movements present. Normal tone and bulk.  Deep tendon reflexes: 2/4 and symmetrical in biceps, brachioradialis, triceps, bilateral 2/4 knees and ankles. Both plantars are flexor.    Cerebellar: Finger to nose and mirror movements normal bilaterally.    Gait and balance: Deferred.     Physical exam performed by FRANCES Parson.  ________________________________________________________   RESULTS REVIEW:    VITAL SIGNS:   Temp:  [97.9 °F (36.6 °C)-98.1 °F (36.7 °C)] 97.9 °F (36.6 °C)  Heart Rate:  [60-79] 62  Resp:  [16-21] 21  BP: (147-160)/(61-87) 148/66     LABS:  WBC   Date Value Ref Range Status   09/21/2020 8.50 3.40 - 10.80 10*3/mm3 Final     RBC   Date Value Ref Range Status   09/21/2020 4.57 3.77 - 5.28 10*6/mm3 Final     Hemoglobin   Date Value Ref Range Status   09/21/2020 13.1 12.0 - 15.9 g/dL Final     Hematocrit   Date Value Ref Range Status   09/21/2020 39.5 34.0 - 46.6 % Final     MCV   Date Value Ref Range Status   09/21/2020 86.5 79.0 - 97.0 fL Final     MCH   Date Value Ref Range Status   09/21/2020 28.8 26.6 - 33.0 pg Final     [facility]   Date Value Ref Range Status   09/21/2020 33.3 31.5 - 35.7 g/dL Final      RDW   Date Value Ref Range Status   09/21/2020 13.6 12.3 - 15.4 % Final     RDW-SD   Date Value Ref Range Status   09/21/2020 40.7 37.0 - 54.0 fl Final     MPV   Date Value Ref Range Status   09/21/2020 7.6 6.0 - 12.0 fL Final     Platelets   Date Value Ref Range Status   09/21/2020 341 140 - 450 10*3/mm3 Final     Neutrophil %   Date Value Ref Range Status   09/21/2020 51.7 42.7 - 76.0 % Final     Lymphocyte %   Date Value Ref Range Status   09/21/2020 33.4 19.6 - 45.3 % Final     Monocyte %   Date Value Ref Range Status   09/21/2020 10.4 5.0 - 12.0 % Final     Eosinophil %   Date Value Ref Range Status   09/21/2020 3.9 0.3 - 6.2 % Final     Basophil %   Date Value Ref Range Status   09/21/2020 0.6 0.0 - 1.5 % Final     Neutrophils, Absolute   Date Value Ref Range Status   09/21/2020 4.40 1.70 - 7.00 10*3/mm3 Final     Lymphocytes, Absolute   Date Value Ref Range Status   09/21/2020 2.80 0.70 - 3.10 10*3/mm3 Final     Monocytes, Absolute   Date Value Ref Range Status   09/21/2020 0.90 0.10 - 0.90 10*3/mm3 Final     Eosinophils, Absolute   Date Value Ref Range Status   09/21/2020 0.30 0.00 - 0.40 10*3/mm3 Final     Basophils, Absolute   Date Value Ref Range Status   09/21/2020 0.00 0.00 - 0.20 10*3/mm3 Final     nRBC   Date Value Ref Range Status   09/21/2020 0.1 0.0 - 0.2 /100 WBC Final     Glucose   Date Value Ref Range Status   09/21/2020 82 65 - 99 mg/dL Final     BUN   Date Value Ref Range Status   09/21/2020   Final     Comment:     Testing performed by alternate method   09/21/2020 13 6 - 20 mg/dL Final     Creatinine   Date Value Ref Range Status   09/21/2020 1.24 (H) 0.57 - 1.00 mg/dL Final     Sodium   Date Value Ref Range Status   09/21/2020 144 136 - 145 mmol/L Final     Potassium   Date Value Ref Range Status   09/21/2020 4.0 3.5 - 5.2 mmol/L Final     Chloride   Date Value Ref Range Status   09/21/2020 108 (H) 98 - 107 mmol/L Final     CO2   Date Value Ref Range Status   09/21/2020 27.0 22.0 - 29.0  mmol/L Final     Calcium   Date Value Ref Range Status   09/21/2020 9.0 8.6 - 10.5 mg/dL Final     eGFR Non  Amer   Date Value Ref Range Status   09/21/2020 45 (L) >60 mL/min/1.73 Final     BUN/Creatinine Ratio   Date Value Ref Range Status   09/21/2020   Final     Comment:     Testing not performed     Anion Gap   Date Value Ref Range Status   09/21/2020 9.0 5.0 - 15.0 mmol/L Final       Lab Results   Component Value Date    TSH 1.840 09/22/2020    LDL 95 09/22/2020         IMAGING STUDIES:  Ct Head Without Contrast    Result Date: 9/21/2020  No acute intracranial abnormality. Electronically signed by:  Chente Curry M.D.  9/21/2020 10:07 PM      I reviewed the patient's new clinical results.      ________________________________________________________     PROBLEM LIST:    Difficulty with speech    HTN (hypertension)    Migraine    Hyperlipidemia    Hypothyroidism    Morbid obesity (CMS/HCC)    GERD without esophagitis    DEVON (acute kidney injury) (CMS/[facility])    Seasonal allergies    Menopause          Assessment/Plan   ASSESSMENT/PLAN:  1.  Word finding difficulty, resolved.  No other focal deficit, visual changes or ataxia.  MRI brain negative for stroke, likely TIA but cannot rule out seizure.  Another differential is migraine, pt has significant history.   - CT head did not show acute findings   - MRI brain: No evidence of acute ischemia, mild subcortical T2 flair white matter hyperintensities nonspecific.  - CTA head and neck:  Normal intracranial and extracranial CTA.  - Echo pending read- can have PCP follow up with outpatient.   - EKG: Sinus rhythm, rate 68, UT interval 155  - Labs: A1C: 5.8, B12: 890, LDL:  95, TSH: 1.840  - Antithrombotics: Switch to Plavix 75 mg daily  - Statin: Increase to Lipitor 80 mg  - PT/OT/ST as appropriate, Neuro checks per protocol, DVT prophylaxis, Stroke education  -Discussed if patient has further word finding difficulties in the future, patient will need to have an  EEG outpatient to rule out seizures.     2. Hyperlipidemia  - Statin & dietary modifications    3. Hypertension  - Continue home medications  - BP goal 130/90, avoid hypotension    4. Modification of stroke risk factors:   - Blood pressure should be less than 130/80 outpatient, HbA1c less than 6.5, LDL less than 70; b12>500 and smoking cessation if applicable. We would be grateful if the primary team / primary care physician would keep a close watch on the above targets.  - Stroke education    We will sign off.  No further recommendation at this time.  Follow-up with primary care provider. I discussed the patient's findings and my recommendations with patient and nursing staff.     Gayle Irvin Wood, APRN  20  09:49 EDT          Electronically signed by Clau Matt MD at 20 1602          Discharge Summary      Dread Thompson MD at 20 1558                [facility]  DISCHARGE SUMMARY        Prepared For PCP:  Rey Portillo PA-C    Patient Name: Judy Cooper  : 1965  MRN: [id number]      Date of Admission:   2020    Date of Discharge:  2020    Length of stay:  LOS: 0 days     Hospital Course     Presenting Problem:   Confusion [R41.0]  Dysarthria [R47.1]      Active Hospital Problems    Diagnosis  POA   • **Difficulty with speech [R47.9]  Yes   • Morbid obesity (CMS/HCC) [E66.01]  Yes   • GERD without esophagitis [K21.9]  Yes   • DEVON (acute kidney injury) (CMS/[facility]) [N17.9]  Yes   • Seasonal allergies [J30.2]  Yes   • Menopause [Z78.0]  Yes   • Migraine [G43.909]  Yes   • Hypothyroidism [E03.9]  Yes   • Hyperlipidemia [E78.5]  Yes   • HTN (hypertension) [I10]  Yes      Resolved Hospital Problems   No resolved problems to display.           Hospital Course:  Judy Cooper is a 55 y.o. female admitted and discharged on the same day.  She had been evaluated by neurologist and her medications adjusted as.  H&P  note.    Patient is aware of the results of her labs and imaging and need for follow-up with neurology    Refer to H&P note for details  No driving  Depression score was slightly elevated.  Patient given option to see psychiatry in the hospital.  She is not suicidal per nursing staff.        Recommendation for Outpatient Providers:             Reasons For Change In Medications and Indications for New Medications:        Day of Discharge     HPI:       Vital Signs:   Temp:  [97.8 °F (36.6 °C)-98.1 °F (36.7 °C)] 98.1 °F (36.7 °C)  Heart Rate:  [60-79] 72  Resp:  [16-22] 22  BP: (147-178)/(60-87) 178/81     Physical Exam:  Physical Exam    Pertinent  and/or Most Recent Results     Results from last 7 days   Lab Units 09/21/20  2311 09/21/20  2310   WBC 10*3/mm3 8.50  --    HEMOGLOBIN g/dL 13.1  --    HEMATOCRIT % 39.5  --    PLATELETS 10*3/mm3 341  --    SODIUM mmol/L  --  144   POTASSIUM mmol/L  --  4.0   CHLORIDE mmol/L  --  108*   CO2 mmol/L  --  27.0   BUN   --  13   CREATININE mg/dL  --  1.24*   GLUCOSE mg/dL  --  82   CALCIUM mg/dL  --  9.0           Invalid input(s): PROT, LABALBU  Results from last 7 days   Lab Units 09/22/20  0415   CHOLESTEROL mg/dL 175   TRIGLYCERIDES mg/dL 173*   HDL CHOL mg/dL 45     Results from last 7 days   Lab Units 09/22/20  0415   TSH uIU/mL 1.840   HEMOGLOBIN A1C % 5.8*       Brief Urine Lab Results     None          Microbiology Results Abnormal     None          Ct Head Without Contrast    Result Date: 9/21/2020  Impression: No acute intracranial abnormality. Electronically signed by:  Chente Curry M.D.  9/21/2020 10:07 PM    Ct Angiogram Neck    Result Date: 9/22/2020  Impression:  1. Normal intracranial and extracranial CTA.  Electronically Signed By-Luis Humphrey On:9/22/2020 2:54 PM This report was finalized on [id number] by  Luis Humphrey, .    Mri Brain Without Contrast    Result Date: 9/22/2020  Impression: 1.No evidence of acute ischemia. 2.Mild subcortical T2/FLAIR white  matter hyperintensities are nonspecific, but are most commonly seen in the setting of chronic small vessel ischemic change.  Electronically Signed By-Alesha Ortiz On:9/22/2020 11:31 AM This report was finalized on [id number] by  Alesha Ortiz, .    Ct Angiogram Head    Result Date: 9/22/2020  Impression:  1. Normal intracranial and extracranial CTA.  Electronically Signed By-Luis Humphrey On:9/22/2020 2:54 PM This report was finalized on [id number] by  Luis Humphrey, .                             Test Results Pending at Discharge  Pending Labs     Order Current Status    Extra Tubes In process            Procedures Performed           Consults:   Consults     Date and Time Order Name Status Description    9/22/2020 0223 Inpatient Neurology Consult Stroke Completed     9/22/2020 0012 Hospitalist (on-call MD unless specified) Completed             Discharge Details        Discharge Medications      New Medications      Instructions Start Date   clopidogrel 75 MG tablet  Commonly known as: PLAVIX   75 mg, Oral, Daily   Start Date: September 23, 2020     levothyroxine 75 MCG tablet  Commonly known as: SYNTHROID, LEVOTHROID   75 mcg, Oral, Daily      lisinopril 40 MG tablet  Commonly known as: PRINIVIL,ZESTRIL   40 mg, Oral, Every 24 Hours Scheduled      SUMAtriptan 50 MG tablet  Commonly known as: IMITREX   Take one tablet at onset of headache. May repeat dose one time in 2 hours if headache not relieved.         Changes to Medications      Instructions Start Date   atorvastatin 80 MG tablet  Commonly known as: LIPITOR  What changed:   · medication strength  · how much to take   80 mg, Oral, Nightly         Continue These Medications      Instructions Start Date   cetirizine 10 MG tablet  Commonly known as: zyrTEC   10 mg, Oral, Daily      montelukast 10 MG tablet  Commonly known as: SINGULAIR   10 mg, Oral, Nightly      NON FORMULARY   1 each, Oral, Daily, DHEA-5       PROGESTERONE PO   300 mg, Oral, Daily       SUPER B COMPLEX PO   1 tablet, Oral, Daily      vilazodone 40 MG tablet tablet  Commonly known as: VIIBRYD   40 mg, Oral, Daily         Stop These Medications    aspirin 81 MG EC tablet            Allergies   Allergen Reactions   • Tetanus Toxoids Anaphylaxis         Discharge Disposition:  Home or Self Care    Diet:  Hospital:  Diet Order   Procedures   • Diet Cardiac; Healthy Heart         Discharge Activity:         CODE STATUS:    Code Status and Medical Interventions:   Ordered at: 09/22/20 0223     Code Status:    CPR     Medical Interventions (Level of Support Prior to Arrest):    Full         Follow-up Appointments  No future appointments.          Condition on Discharge:      Stable      This patient has been examined wearing appropriate Personal Protective Equipment and discussed with hospital infection control department. 09/22/20      Electronically signed by Dread Thompson MD, 09/22/20, 3:58 PM EDT.      Time: I spent  55  minutes on this discharge activity which included face-to-face encounter with the patient/reviewing the data in the system/coordination of the care with the nursing staff as well as consultants/documentation/entering orders.      Electronically signed by Dread Thompson MD at 09/22/20 [id number]

## 2020-09-23 NOTE — PROGRESS NOTES
Case Management Discharge Note                Selected Continued Care - Discharged on 9/22/2020 Admission date: 9/21/2020 - Discharge disposition: Home or Self Care                 Final Discharge Disposition Code: 01 - home or self-care

## 2020-11-11 ENCOUNTER — ON CAMPUS - OUTPATIENT (OUTPATIENT)
Dept: URBAN - METROPOLITAN AREA HOSPITAL 77 | Facility: HOSPITAL | Age: 55
End: 2020-11-11
Payer: COMMERCIAL

## 2020-11-11 DIAGNOSIS — Z86.010 PERSONAL HISTORY OF COLONIC POLYPS: ICD-10-CM

## 2020-11-11 DIAGNOSIS — K57.30 DIVERTICULOSIS OF LARGE INTESTINE WITHOUT PERFORATION OR ABS: ICD-10-CM

## 2020-11-11 PROCEDURE — 45378 DIAGNOSTIC COLONOSCOPY: CPT | Mod: 33 | Performed by: INTERNAL MEDICINE
